# Patient Record
Sex: MALE | Race: WHITE | NOT HISPANIC OR LATINO | URBAN - METROPOLITAN AREA
[De-identification: names, ages, dates, MRNs, and addresses within clinical notes are randomized per-mention and may not be internally consistent; named-entity substitution may affect disease eponyms.]

---

## 2017-09-17 ENCOUNTER — EMERGENCY (EMERGENCY)
Facility: HOSPITAL | Age: 72
LOS: 1 days | Discharge: HOME | End: 2017-09-17
Attending: EMERGENCY MEDICINE
Payer: MEDICARE

## 2017-09-17 VITALS
OXYGEN SATURATION: 99 % | HEIGHT: 72 IN | WEIGHT: 240.08 LBS | SYSTOLIC BLOOD PRESSURE: 148 MMHG | RESPIRATION RATE: 18 BRPM | HEART RATE: 105 BPM | TEMPERATURE: 98 F | DIASTOLIC BLOOD PRESSURE: 89 MMHG

## 2017-09-17 VITALS
SYSTOLIC BLOOD PRESSURE: 153 MMHG | RESPIRATION RATE: 18 BRPM | OXYGEN SATURATION: 98 % | TEMPERATURE: 100 F | HEART RATE: 102 BPM | DIASTOLIC BLOOD PRESSURE: 85 MMHG

## 2017-09-17 LAB
ANION GAP SERPL CALC-SCNC: 16 MMOL/L — SIGNIFICANT CHANGE UP (ref 5–17)
APPEARANCE UR: CLEAR — SIGNIFICANT CHANGE UP
BASOPHILS # BLD AUTO: 0 K/UL — SIGNIFICANT CHANGE UP (ref 0–0.2)
BASOPHILS NFR BLD AUTO: 0.2 % — SIGNIFICANT CHANGE UP (ref 0–2)
BILIRUB UR-MCNC: NEGATIVE — SIGNIFICANT CHANGE UP
BUN SERPL-MCNC: 31 MG/DL — HIGH (ref 8–20)
CALCIUM SERPL-MCNC: 9.8 MG/DL — SIGNIFICANT CHANGE UP (ref 8.6–10.2)
CHLORIDE SERPL-SCNC: 97 MMOL/L — LOW (ref 98–107)
CO2 SERPL-SCNC: 22 MMOL/L — SIGNIFICANT CHANGE UP (ref 22–29)
COLOR SPEC: YELLOW — SIGNIFICANT CHANGE UP
CREAT SERPL-MCNC: 1.09 MG/DL — SIGNIFICANT CHANGE UP (ref 0.5–1.3)
DIFF PNL FLD: NEGATIVE — SIGNIFICANT CHANGE UP
EOSINOPHIL # BLD AUTO: 0 K/UL — SIGNIFICANT CHANGE UP (ref 0–0.5)
EOSINOPHIL NFR BLD AUTO: 0.1 % — SIGNIFICANT CHANGE UP (ref 0–5)
GLUCOSE SERPL-MCNC: 308 MG/DL — HIGH (ref 70–115)
GLUCOSE UR QL: 1000 MG/DL
HCT VFR BLD CALC: 50.9 % — SIGNIFICANT CHANGE UP (ref 42–52)
HGB BLD-MCNC: 18.4 G/DL — HIGH (ref 14–18)
KETONES UR-MCNC: ABNORMAL
LEUKOCYTE ESTERASE UR-ACNC: NEGATIVE — SIGNIFICANT CHANGE UP
LYMPHOCYTES # BLD AUTO: 1.3 K/UL — SIGNIFICANT CHANGE UP (ref 1–4.8)
LYMPHOCYTES # BLD AUTO: 11.2 % — LOW (ref 20–55)
MCHC RBC-ENTMCNC: 32.1 PG — HIGH (ref 27–31)
MCHC RBC-ENTMCNC: 36.1 G/DL — HIGH (ref 32–36)
MCV RBC AUTO: 88.8 FL — SIGNIFICANT CHANGE UP (ref 80–94)
MONOCYTES # BLD AUTO: 0.3 K/UL — SIGNIFICANT CHANGE UP (ref 0–0.8)
MONOCYTES NFR BLD AUTO: 2.3 % — LOW (ref 3–10)
NEUTROPHILS # BLD AUTO: 10.2 K/UL — HIGH (ref 1.8–8)
NEUTROPHILS NFR BLD AUTO: 85.7 % — HIGH (ref 37–73)
NITRITE UR-MCNC: NEGATIVE — SIGNIFICANT CHANGE UP
PH UR: 5 — SIGNIFICANT CHANGE UP (ref 5–8)
PLATELET # BLD AUTO: 262 K/UL — SIGNIFICANT CHANGE UP (ref 150–400)
POTASSIUM SERPL-MCNC: 5.1 MMOL/L — SIGNIFICANT CHANGE UP (ref 3.5–5.3)
POTASSIUM SERPL-SCNC: 5.1 MMOL/L — SIGNIFICANT CHANGE UP (ref 3.5–5.3)
PROT UR-MCNC: 30 MG/DL
RBC # BLD: 5.73 M/UL — SIGNIFICANT CHANGE UP (ref 4.6–6.2)
RBC # FLD: 12.6 % — SIGNIFICANT CHANGE UP (ref 11–15.6)
SODIUM SERPL-SCNC: 135 MMOL/L — SIGNIFICANT CHANGE UP (ref 135–145)
SP GR SPEC: 1.02 — SIGNIFICANT CHANGE UP (ref 1.01–1.02)
UROBILINOGEN FLD QL: 1 MG/DL
WBC # BLD: 11.9 K/UL — HIGH (ref 4.8–10.8)
WBC # FLD AUTO: 11.9 K/UL — HIGH (ref 4.8–10.8)
WBC UR QL: SIGNIFICANT CHANGE UP

## 2017-09-17 PROCEDURE — 99284 EMERGENCY DEPT VISIT MOD MDM: CPT | Mod: 25

## 2017-09-17 PROCEDURE — 96375 TX/PRO/DX INJ NEW DRUG ADDON: CPT

## 2017-09-17 PROCEDURE — 99284 EMERGENCY DEPT VISIT MOD MDM: CPT

## 2017-09-17 PROCEDURE — 73502 X-RAY EXAM HIP UNI 2-3 VIEWS: CPT | Mod: 26,RT

## 2017-09-17 PROCEDURE — 85027 COMPLETE CBC AUTOMATED: CPT

## 2017-09-17 PROCEDURE — 80048 BASIC METABOLIC PNL TOTAL CA: CPT

## 2017-09-17 PROCEDURE — 36415 COLL VENOUS BLD VENIPUNCTURE: CPT

## 2017-09-17 PROCEDURE — 81001 URINALYSIS AUTO W/SCOPE: CPT

## 2017-09-17 PROCEDURE — 73502 X-RAY EXAM HIP UNI 2-3 VIEWS: CPT

## 2017-09-17 PROCEDURE — 96374 THER/PROPH/DIAG INJ IV PUSH: CPT

## 2017-09-17 PROCEDURE — 93010 ELECTROCARDIOGRAM REPORT: CPT

## 2017-09-17 PROCEDURE — 72110 X-RAY EXAM L-2 SPINE 4/>VWS: CPT

## 2017-09-17 PROCEDURE — 87086 URINE CULTURE/COLONY COUNT: CPT

## 2017-09-17 PROCEDURE — 72110 X-RAY EXAM L-2 SPINE 4/>VWS: CPT | Mod: 26

## 2017-09-17 PROCEDURE — 93005 ELECTROCARDIOGRAM TRACING: CPT

## 2017-09-17 RX ORDER — DIAZEPAM 5 MG
1 TABLET ORAL
Qty: 15 | Refills: 0
Start: 2017-09-17 | End: 2017-09-22

## 2017-09-17 RX ORDER — OXYCODONE AND ACETAMINOPHEN 5; 325 MG/1; MG/1
1 TABLET ORAL ONCE
Qty: 0 | Refills: 0 | Status: DISCONTINUED | OUTPATIENT
Start: 2017-09-17 | End: 2017-09-17

## 2017-09-17 RX ORDER — DEXAMETHASONE 0.5 MG/5ML
6 ELIXIR ORAL ONCE
Qty: 0 | Refills: 0 | Status: DISCONTINUED | OUTPATIENT
Start: 2017-09-17 | End: 2017-09-17

## 2017-09-17 RX ORDER — KETOROLAC TROMETHAMINE 30 MG/ML
15 SYRINGE (ML) INJECTION ONCE
Qty: 0 | Refills: 0 | Status: DISCONTINUED | OUTPATIENT
Start: 2017-09-17 | End: 2017-09-17

## 2017-09-17 RX ORDER — SODIUM CHLORIDE 9 MG/ML
1000 INJECTION INTRAMUSCULAR; INTRAVENOUS; SUBCUTANEOUS
Qty: 0 | Refills: 0 | Status: DISCONTINUED | OUTPATIENT
Start: 2017-09-17 | End: 2017-09-21

## 2017-09-17 RX ORDER — DEXAMETHASONE 0.5 MG/5ML
6 ELIXIR ORAL ONCE
Qty: 0 | Refills: 0 | Status: COMPLETED | OUTPATIENT
Start: 2017-09-17 | End: 2017-09-17

## 2017-09-17 RX ORDER — OXYCODONE AND ACETAMINOPHEN 5; 325 MG/1; MG/1
1 TABLET ORAL EVERY 6 HOURS
Qty: 0 | Refills: 0 | Status: DISCONTINUED | OUTPATIENT
Start: 2017-09-17 | End: 2017-09-17

## 2017-09-17 RX ORDER — OXYCODONE AND ACETAMINOPHEN 5; 325 MG/1; MG/1
2 TABLET ORAL
Qty: 30 | Refills: 0
Start: 2017-09-17 | End: 2017-09-22

## 2017-09-17 RX ADMIN — Medication 6 MILLIGRAM(S): at 14:56

## 2017-09-17 RX ADMIN — OXYCODONE AND ACETAMINOPHEN 1 TABLET(S): 5; 325 TABLET ORAL at 17:47

## 2017-09-17 RX ADMIN — Medication 15 MILLIGRAM(S): at 17:47

## 2017-09-17 RX ADMIN — SODIUM CHLORIDE 150 MILLILITER(S): 9 INJECTION INTRAMUSCULAR; INTRAVENOUS; SUBCUTANEOUS at 19:51

## 2017-09-17 RX ADMIN — OXYCODONE AND ACETAMINOPHEN 1 TABLET(S): 5; 325 TABLET ORAL at 16:22

## 2017-09-17 RX ADMIN — OXYCODONE AND ACETAMINOPHEN 1 TABLET(S): 5; 325 TABLET ORAL at 22:27

## 2017-09-17 NOTE — ED PROVIDER NOTE - OBJECTIVE STATEMENT
72 year old male with a h/o sciatica presents with back pain. Pt had a similar episode one year ago and was seen by ortho who gave him steroid injections which relieved his symptoms

## 2017-09-17 NOTE — ED ADULT NURSE REASSESSMENT NOTE - NS ED NURSE REASSESS COMMENT FT1
Pt. verbalizes feeling better with percocet. Pt. able to sit comfortably in wheelchair. pt. ok for d/c.

## 2017-09-17 NOTE — ED PROVIDER NOTE - CHIEF COMPLAINT
The patient is a 72y Male complaining of low back pain.pt had a similar episode one year ago and was seen by ortho who felt that he had sciatica

## 2017-09-17 NOTE — ED PROVIDER NOTE - PROGRESS NOTE DETAILS
pt feels better and willl be discharged with outpatient follow up. pt already has an appointment for orthopedics

## 2017-09-17 NOTE — ED PROVIDER NOTE - CARE PLAN
Principal Discharge DX:	Acute low back pain with right-sided sciatica, unspecified back pain laterality  Secondary Diagnosis:	Diabetes  Secondary Diagnosis:	Hyperglycemia

## 2017-09-17 NOTE — ED ADULT NURSE REASSESSMENT NOTE - NS ED NURSE REASSESS COMMENT FT1
Assumed pt. care from Kaleb JOSHUA. Pt. is a/o x3 with family at bedside. Pt. is positioned on left side with pillows behind pelvis to support. Pt. verbalizes relief after pain medication. Pt. is able to move all extremities with ease. Pt. only has tenderness to touch in mid right buttocks when palpated. Pt. is smiling and interactive at this time and verbalizes desire to go home. Family refuses and wants pt. to stay in hospital d/t difficulty with ADL's. Informed MD Bustamante; requests to have patient attempt to ambulate and d/c to follow upwith pMD for MRI. Assumed pt. care from Kaleb JOSHUA. Pt. is a/o x3 with family at bedside. Pt. is positioned on left side with pillows behind pelvis to support. Pt. verbalizes relief after pain medication. Pt. is able to move all extremities with ease. Pt. only has tenderness to touch in mid right buttocks when palpated. No deformities or skin changes noted in area, skin is warm, dry, and intact. Pt. is smiling and interactive at this time and verbalizes desire to go home. Family refuses and wants pt. to stay in hospital d/t difficulty with ADL's. Informed MD Bustamante; requests to have patient attempt to ambulate and d/c to follow upwith pMD for MRI.

## 2017-09-19 LAB
CULTURE RESULTS: SIGNIFICANT CHANGE UP
SPECIMEN SOURCE: SIGNIFICANT CHANGE UP

## 2020-08-21 NOTE — ED PROVIDER NOTE - NEURO NEGATIVE STATEMENT, MLM
You can access the FollowMyHealth Patient Portal offered by [facility] by registering at the following website: [email or web address]. By joining [employer]’s FollowMyHealth portal, you will also be able to view your health information using other applications (apps) compatible with our system. no loss of consciousness, no gait abnormality, no headache, no sensory deficits, and no weakness.

## 2020-08-31 ENCOUNTER — NEW PATIENT (OUTPATIENT)
Dept: URBAN - METROPOLITAN AREA CLINIC 10 | Facility: CLINIC | Age: 75
End: 2020-08-31
Payer: COMMERCIAL

## 2020-08-31 DIAGNOSIS — I10 ESSENTIAL (PRIMARY) HYPERTENSION: ICD-10-CM

## 2020-08-31 DIAGNOSIS — E11.39 TYPE 2 DIABETES MELLITUS WITH OPHTHALMIC COMPLICAT: ICD-10-CM

## 2020-08-31 PROCEDURE — 92134 CPTRZ OPH DX IMG PST SGM RTA: CPT | Performed by: OPHTHALMOLOGY

## 2020-08-31 PROCEDURE — 92004 COMPRE OPH EXAM NEW PT 1/>: CPT | Performed by: OPHTHALMOLOGY

## 2020-08-31 ASSESSMENT — INTRAOCULAR PRESSURE
OS: 14
OD: 14

## 2020-08-31 ASSESSMENT — VISUAL ACUITY
OD: 20/50
OS: 20/40

## 2020-12-28 ENCOUNTER — FOLLOW UP ESTABLISHED (OUTPATIENT)
Dept: URBAN - METROPOLITAN AREA CLINIC 10 | Facility: CLINIC | Age: 75
End: 2020-12-28
Payer: COMMERCIAL

## 2020-12-28 DIAGNOSIS — Z79.84 LONG TERM (CURRENT) USE OF ORAL HYPOGLYCEMIC DRUGS: ICD-10-CM

## 2020-12-28 DIAGNOSIS — H43.823 VITREOMACULAR ADHESION, BILATERAL: ICD-10-CM

## 2020-12-28 PROCEDURE — 99214 OFFICE O/P EST MOD 30 MIN: CPT | Performed by: OPHTHALMOLOGY

## 2020-12-28 PROCEDURE — 92250 FUNDUS PHOTOGRAPHY W/I&R: CPT | Performed by: OPHTHALMOLOGY

## 2020-12-28 ASSESSMENT — VISUAL ACUITY
OS: 20/30
OD: 20/40

## 2020-12-28 ASSESSMENT — INTRAOCULAR PRESSURE
OD: 14
OS: 13

## 2021-04-02 ENCOUNTER — OFFICE VISIT (OUTPATIENT)
Dept: URBAN - METROPOLITAN AREA CLINIC 10 | Facility: CLINIC | Age: 76
End: 2021-04-02
Payer: COMMERCIAL

## 2021-04-02 DIAGNOSIS — H35.373 PUCKERING OF MACULA, BILATERAL: ICD-10-CM

## 2021-04-02 DIAGNOSIS — H35.379 PUCKERING OF MACULA, UNSPECIFIED EYE: ICD-10-CM

## 2021-04-02 PROCEDURE — 92134 CPTRZ OPH DX IMG PST SGM RTA: CPT | Performed by: OPHTHALMOLOGY

## 2021-04-02 PROCEDURE — 99214 OFFICE O/P EST MOD 30 MIN: CPT | Performed by: OPHTHALMOLOGY

## 2021-04-02 ASSESSMENT — VISUAL ACUITY
OS: 20/40
OD: 20/60

## 2021-04-02 ASSESSMENT — INTRAOCULAR PRESSURE
OS: 15
OD: 15

## 2021-04-02 ASSESSMENT — KERATOMETRY: OS: 43.25

## 2021-04-02 NOTE — IMPRESSION/PLAN
Impression: Age-related nuclear cataract, bilateral: H25.13. Plan: Suggest cataract surgery right eye first then left eye. 
Not a optimal candiATE FOR MULTIFOCAL LENSES, HAS DIBETIC RETINOPATHY

## 2021-04-02 NOTE — IMPRESSION/PLAN
Impression: Other vitreous opacities, left eye: H43.392. Plan: Reviewed with patient . .. Patient will contact the office immediately  if notes any new  vision changes. history of  VITRECTOMY IN RIGHT EYE. 
RETINAL SURGEON AT [facility] ~~ [address]

## 2021-04-02 NOTE — IMPRESSION/PLAN
Impression: Puckering of macula, unspecified eye: H35.379. Plan: Reviewed with patient . .. Patient will contact the office immediately  if notes any new  vision changes.

## 2021-04-02 NOTE — IMPRESSION/PLAN
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 Plan: Discussed good blood sugar and blood pressure - diet, exercise, and nutritional control emphasized to reduce future risk of diabetic complications. Maintain follow up with PCP.

## 2021-05-03 ENCOUNTER — ADULT PHYSICAL (OUTPATIENT)
Dept: URBAN - METROPOLITAN AREA CLINIC 10 | Facility: CLINIC | Age: 76
End: 2021-05-03
Payer: COMMERCIAL

## 2021-05-03 DIAGNOSIS — Z01.818 ENCOUNTER FOR OTHER PREPROCEDURAL EXAMINATION: Primary | ICD-10-CM

## 2021-05-03 DIAGNOSIS — H25.13 AGE-RELATED NUCLEAR CATARACT, BILATERAL: Primary | ICD-10-CM

## 2021-05-03 PROCEDURE — 92025 CPTRIZED CORNEAL TOPOGRAPHY: CPT | Performed by: OPHTHALMOLOGY

## 2021-05-03 PROCEDURE — 92136 OPHTHALMIC BIOMETRY: CPT | Performed by: OPHTHALMOLOGY

## 2021-05-03 PROCEDURE — 99202 OFFICE O/P NEW SF 15 MIN: CPT | Performed by: PHYSICIAN ASSISTANT

## 2021-05-04 ENCOUNTER — PRE-OPERATIVE VISIT (OUTPATIENT)
Dept: URBAN - METROPOLITAN AREA CLINIC 10 | Facility: CLINIC | Age: 76
End: 2021-05-04
Payer: COMMERCIAL

## 2021-05-04 DIAGNOSIS — H52.13 MYOPIA, BILATERAL: ICD-10-CM

## 2021-05-04 DIAGNOSIS — H52.221 REGULAR ASTIGMATISM, RIGHT EYE: ICD-10-CM

## 2021-05-04 DIAGNOSIS — H25.811 COMBINED FORMS OF AGE-RELATED CATARACT, RIGHT EYE: Primary | ICD-10-CM

## 2021-05-04 DIAGNOSIS — E11.3293 TYPE 2 DIAB W MILD NONPRLF DIABETIC RTNOP W/O MACULAR EDEMA, BILATERAL: ICD-10-CM

## 2021-05-04 DIAGNOSIS — H43.392 OTHER VITREOUS OPACITIES, LEFT EYE: ICD-10-CM

## 2021-05-04 DIAGNOSIS — H25.812 COMBINED FORMS OF AGE-RELATED CATARACT, LEFT EYE: ICD-10-CM

## 2021-05-04 DIAGNOSIS — I63.9 STROKE: ICD-10-CM

## 2021-05-04 PROCEDURE — 99214 OFFICE O/P EST MOD 30 MIN: CPT | Performed by: OPHTHALMOLOGY

## 2021-05-04 RX ORDER — DICLOFENAC SODIUM 1 MG/ML
0.1 % SOLUTION/ DROPS OPHTHALMIC
Qty: 5 | Refills: 2 | Status: ACTIVE
Start: 2021-05-04

## 2021-05-04 RX ORDER — PREDNISOLONE ACETATE 10 MG/ML
1 % SUSPENSION/ DROPS OPHTHALMIC
Qty: 5 | Refills: 2 | Status: ACTIVE
Start: 2021-05-04

## 2021-05-04 ASSESSMENT — INTRAOCULAR PRESSURE
OS: 15
OD: 16

## 2021-05-04 ASSESSMENT — PACHYMETRY
OS: 3.60
OD: 3.45
OS: 25.95
OD: 26.18

## 2021-05-04 NOTE — IMPRESSION/PLAN
Impression: Type 2 diab w mild nonprlf diabetic rtnop w/o macular edema, bilateral: H46.2285. Plan: Discussed diet, exercise, nutrition. Good blood sugar and blood pressure control. Maintain follow up with PCP. Discussed with patient, understands this may limit vision after surgery.

## 2021-05-04 NOTE — IMPRESSION/PLAN
Impression: Other vitreous opacities, left eye Plan: Discussed signs and symptoms of retinal detachment (flashes,floaters, curtain) as precaution . Patient instructed to call or return to clinic if condition gets worse. Discussed with patient, understands this may limit vision after surgery.

## 2021-05-04 NOTE — IMPRESSION/PLAN
Impression: Astigmatism, right eye: H52.221. Plan: Discussed with patient that LRI will attempt to reduce the astigmatism but will not get rid of all astigmatism and will need glasses after surgery. Discussed with patient, understands this may limit vision after surgery. Also discussed unmasking of astigmatism.

## 2021-05-04 NOTE — IMPRESSION/PLAN
Impression: Stroke: I63.9. Plan: Discussed with patient, understands this may limit vision after surgery.

## 2021-05-04 NOTE — IMPRESSION/PLAN
Impression: Puckering of macula, unspecified eye: H35.379. Plan: followed by Dr. Patrice Baez; s/p PPVIT/MP OD; Discussed signs and symptoms of retinal detachment (flashes,floaters, curtain) as precaution . Patient instructed to call or return to clinic if condition gets worse. Discussed with patient, understands this may limit vision after surgery.

## 2021-05-05 ENCOUNTER — TESTING ONLY (OUTPATIENT)
Dept: URBAN - METROPOLITAN AREA CLINIC 43 | Facility: CLINIC | Age: 76
End: 2021-05-05
Payer: COMMERCIAL

## 2021-05-05 PROCEDURE — 92082 INTERMEDIATE VISUAL FIELD XM: CPT | Performed by: OPTOMETRIST

## 2021-05-11 ENCOUNTER — SURGERY (OUTPATIENT)
Dept: URBAN - METROPOLITAN AREA SURGERY 5 | Facility: SURGERY | Age: 76
End: 2021-05-11
Payer: COMMERCIAL

## 2021-05-11 PROCEDURE — 66982 XCAPSL CTRC RMVL CPLX WO ECP: CPT | Performed by: OPHTHALMOLOGY

## 2021-05-12 ENCOUNTER — POST-OPERATIVE VISIT (OUTPATIENT)
Dept: URBAN - METROPOLITAN AREA CLINIC 10 | Facility: CLINIC | Age: 76
End: 2021-05-12
Payer: COMMERCIAL

## 2021-05-12 PROCEDURE — 99024 POSTOP FOLLOW-UP VISIT: CPT | Performed by: OPTOMETRIST

## 2021-05-12 ASSESSMENT — INTRAOCULAR PRESSURE: OD: 14

## 2021-05-12 NOTE — IMPRESSION/PLAN
Impression: S/P CE/Standard IOL OD - 1 Day. Encounter for surgical aftercare following surgery on a sense organ  Z48.810. Post operative instructions reviewed - Plan: Begin ofloxacin tid x 1 wk, diclofenac tid x 2 weeks and pred acetate tid x 4 weeks. 
RTC 5/19/2021

## 2021-05-19 ENCOUNTER — POST-OPERATIVE VISIT (OUTPATIENT)
Dept: URBAN - METROPOLITAN AREA CLINIC 10 | Facility: CLINIC | Age: 76
End: 2021-05-19
Payer: COMMERCIAL

## 2021-05-19 DIAGNOSIS — Z48.810 ENCOUNTER FOR SURGICAL AFTERCARE FOLLOWING SURGERY ON A SENSE ORGAN: Primary | ICD-10-CM

## 2021-05-19 PROCEDURE — 99024 POSTOP FOLLOW-UP VISIT: CPT | Performed by: OPTOMETRIST

## 2021-05-19 ASSESSMENT — INTRAOCULAR PRESSURE
OS: 15
OD: 15

## 2021-05-19 ASSESSMENT — VISUAL ACUITY
OD: 20/20
OS: 20/400

## 2021-05-19 NOTE — IMPRESSION/PLAN
Impression: S/P Cataract Extraction/IOL/ORA; LRI OD - 8 Days. Encounter for surgical aftercare following surgery on a sense organ  Z48.810. Condition is improving - Plan: Pt. happy c vision. Confirmed Distance Aim  Cleared for surgery second eye OS

## 2021-05-25 ENCOUNTER — SURGERY (OUTPATIENT)
Dept: URBAN - METROPOLITAN AREA SURGERY 5 | Facility: SURGERY | Age: 76
End: 2021-05-25
Payer: COMMERCIAL

## 2021-05-25 PROCEDURE — 66984 XCAPSL CTRC RMVL W/O ECP: CPT | Performed by: OPHTHALMOLOGY

## 2021-05-26 ENCOUNTER — POST-OPERATIVE VISIT (OUTPATIENT)
Dept: URBAN - METROPOLITAN AREA CLINIC 10 | Facility: CLINIC | Age: 76
End: 2021-05-26
Payer: COMMERCIAL

## 2021-05-26 DIAGNOSIS — Z96.1 PRESENCE OF INTRAOCULAR LENS: Primary | ICD-10-CM

## 2021-05-26 PROCEDURE — 99024 POSTOP FOLLOW-UP VISIT: CPT | Performed by: OPTOMETRIST

## 2021-05-26 ASSESSMENT — INTRAOCULAR PRESSURE: OS: 20

## 2021-05-26 NOTE — IMPRESSION/PLAN
Impression: S/P Cataract Extraction/IOL/ORA OS - 1 Day. Presence of intraocular lens  Z96.1. Plan: Dexycu c gtts. Begin pred acetate and diclofenac tid x 2 weeks  RTC as scheduled c MRx and IOP check

## 2021-06-16 ENCOUNTER — POST-OPERATIVE VISIT (OUTPATIENT)
Dept: URBAN - METROPOLITAN AREA CLINIC 10 | Facility: CLINIC | Age: 76
End: 2021-06-16
Payer: COMMERCIAL

## 2021-06-16 PROCEDURE — 99024 POSTOP FOLLOW-UP VISIT: CPT | Performed by: OPTOMETRIST

## 2021-06-16 ASSESSMENT — VISUAL ACUITY
OS: 20/25
OD: 20/25

## 2021-06-16 ASSESSMENT — INTRAOCULAR PRESSURE
OD: 15
OS: 14

## 2021-06-16 NOTE — IMPRESSION/PLAN
Impression:  Presence of intraocular lens  Z96.1. Plan: Pt. happy c vision. Cont. OTC readers. RTC 6 months for complete.

## 2023-08-02 PROBLEM — E11.9 TYPE 2 DIABETES MELLITUS WITHOUT COMPLICATIONS: Chronic | Status: ACTIVE | Noted: 2017-09-17

## 2023-08-02 PROBLEM — I10 ESSENTIAL (PRIMARY) HYPERTENSION: Chronic | Status: ACTIVE | Noted: 2017-09-17

## 2023-08-07 ENCOUNTER — INPATIENT (INPATIENT)
Facility: HOSPITAL | Age: 78
LOS: 0 days | Discharge: HOME | DRG: 247 | End: 2023-08-08
Attending: INTERNAL MEDICINE | Admitting: INTERNAL MEDICINE
Payer: COMMERCIAL

## 2023-08-07 VITALS
HEIGHT: 73 IN | HEART RATE: 64 BPM | RESPIRATION RATE: 28 BRPM | OXYGEN SATURATION: 100 % | WEIGHT: 248.02 LBS | DIASTOLIC BLOOD PRESSURE: 78 MMHG | SYSTOLIC BLOOD PRESSURE: 145 MMHG

## 2023-08-07 DIAGNOSIS — R94.39 ABNORMAL RESULT OF OTHER CARDIOVASCULAR FUNCTION STUDY: ICD-10-CM

## 2023-08-07 DIAGNOSIS — Z98.890 OTHER SPECIFIED POSTPROCEDURAL STATES: Chronic | ICD-10-CM

## 2023-08-07 LAB
ANION GAP SERPL CALC-SCNC: 10 MMOL/L — SIGNIFICANT CHANGE UP (ref 5–17)
BASOPHILS # BLD AUTO: 0.06 K/UL — SIGNIFICANT CHANGE UP (ref 0–0.2)
BASOPHILS NFR BLD AUTO: 0.9 % — SIGNIFICANT CHANGE UP (ref 0–2)
BLD GP AB SCN SERPL QL: SIGNIFICANT CHANGE UP
BUN SERPL-MCNC: 23.3 MG/DL — HIGH (ref 8–20)
CALCIUM SERPL-MCNC: 9.1 MG/DL — SIGNIFICANT CHANGE UP (ref 8.4–10.5)
CHLORIDE SERPL-SCNC: 102 MMOL/L — SIGNIFICANT CHANGE UP (ref 96–108)
CO2 SERPL-SCNC: 26 MMOL/L — SIGNIFICANT CHANGE UP (ref 22–29)
CREAT SERPL-MCNC: 0.95 MG/DL — SIGNIFICANT CHANGE UP (ref 0.5–1.3)
EGFR: 83 ML/MIN/1.73M2 — SIGNIFICANT CHANGE UP
EOSINOPHIL # BLD AUTO: 0.29 K/UL — SIGNIFICANT CHANGE UP (ref 0–0.5)
EOSINOPHIL NFR BLD AUTO: 4.4 % — SIGNIFICANT CHANGE UP (ref 0–6)
GLUCOSE BLDC GLUCOMTR-MCNC: 74 MG/DL — SIGNIFICANT CHANGE UP (ref 70–99)
GLUCOSE SERPL-MCNC: 114 MG/DL — HIGH (ref 70–99)
HCT VFR BLD CALC: 39.6 % — SIGNIFICANT CHANGE UP (ref 39–50)
HGB BLD-MCNC: 13 G/DL — SIGNIFICANT CHANGE UP (ref 13–17)
IMM GRANULOCYTES NFR BLD AUTO: 0.3 % — SIGNIFICANT CHANGE UP (ref 0–0.9)
LYMPHOCYTES # BLD AUTO: 2.37 K/UL — SIGNIFICANT CHANGE UP (ref 1–3.3)
LYMPHOCYTES # BLD AUTO: 35.9 % — SIGNIFICANT CHANGE UP (ref 13–44)
MCHC RBC-ENTMCNC: 31.3 PG — SIGNIFICANT CHANGE UP (ref 27–34)
MCHC RBC-ENTMCNC: 32.8 GM/DL — SIGNIFICANT CHANGE UP (ref 32–36)
MCV RBC AUTO: 95.4 FL — SIGNIFICANT CHANGE UP (ref 80–100)
MONOCYTES # BLD AUTO: 0.65 K/UL — SIGNIFICANT CHANGE UP (ref 0–0.9)
MONOCYTES NFR BLD AUTO: 9.8 % — SIGNIFICANT CHANGE UP (ref 2–14)
NEUTROPHILS # BLD AUTO: 3.22 K/UL — SIGNIFICANT CHANGE UP (ref 1.8–7.4)
NEUTROPHILS NFR BLD AUTO: 48.7 % — SIGNIFICANT CHANGE UP (ref 43–77)
PLATELET # BLD AUTO: 208 K/UL — SIGNIFICANT CHANGE UP (ref 150–400)
POTASSIUM SERPL-MCNC: 4.3 MMOL/L — SIGNIFICANT CHANGE UP (ref 3.5–5.3)
POTASSIUM SERPL-SCNC: 4.3 MMOL/L — SIGNIFICANT CHANGE UP (ref 3.5–5.3)
RBC # BLD: 4.15 M/UL — LOW (ref 4.2–5.8)
RBC # FLD: 13.3 % — SIGNIFICANT CHANGE UP (ref 10.3–14.5)
SODIUM SERPL-SCNC: 138 MMOL/L — SIGNIFICANT CHANGE UP (ref 135–145)
WBC # BLD: 6.61 K/UL — SIGNIFICANT CHANGE UP (ref 3.8–10.5)
WBC # FLD AUTO: 6.61 K/UL — SIGNIFICANT CHANGE UP (ref 3.8–10.5)

## 2023-08-07 PROCEDURE — 93010 ELECTROCARDIOGRAM REPORT: CPT | Mod: 76

## 2023-08-07 RX ORDER — CLOPIDOGREL BISULFATE 75 MG/1
75 TABLET, FILM COATED ORAL DAILY
Refills: 0 | Status: DISCONTINUED | OUTPATIENT
Start: 2023-08-07 | End: 2023-08-08

## 2023-08-07 RX ORDER — ASPIRIN/CALCIUM CARB/MAGNESIUM 324 MG
81 TABLET ORAL DAILY
Refills: 0 | Status: DISCONTINUED | OUTPATIENT
Start: 2023-08-07 | End: 2023-08-08

## 2023-08-07 RX ORDER — ATORVASTATIN CALCIUM 80 MG/1
80 TABLET, FILM COATED ORAL AT BEDTIME
Refills: 0 | Status: DISCONTINUED | OUTPATIENT
Start: 2023-08-07 | End: 2023-08-08

## 2023-08-07 RX ORDER — LISINOPRIL 2.5 MG/1
10 TABLET ORAL DAILY
Refills: 0 | Status: DISCONTINUED | OUTPATIENT
Start: 2023-08-07 | End: 2023-08-08

## 2023-08-07 RX ORDER — METOPROLOL TARTRATE 50 MG
200 TABLET ORAL DAILY
Refills: 0 | Status: DISCONTINUED | OUTPATIENT
Start: 2023-08-07 | End: 2023-08-08

## 2023-08-07 RX ORDER — SODIUM CHLORIDE 9 MG/ML
250 INJECTION INTRAMUSCULAR; INTRAVENOUS; SUBCUTANEOUS ONCE
Refills: 0 | Status: COMPLETED | OUTPATIENT
Start: 2023-08-07 | End: 2023-08-07

## 2023-08-07 RX ORDER — FOLIC ACID 0.8 MG
1 TABLET ORAL DAILY
Refills: 0 | Status: DISCONTINUED | OUTPATIENT
Start: 2023-08-07 | End: 2023-08-08

## 2023-08-07 RX ORDER — CHLORHEXIDINE GLUCONATE 213 G/1000ML
1 SOLUTION TOPICAL ONCE
Refills: 0 | Status: DISCONTINUED | OUTPATIENT
Start: 2023-08-07 | End: 2023-08-08

## 2023-08-07 RX ADMIN — SODIUM CHLORIDE 500 MILLILITER(S): 9 INJECTION INTRAMUSCULAR; INTRAVENOUS; SUBCUTANEOUS at 16:39

## 2023-08-07 NOTE — H&P PST ADULT - ASSESSMENT
This is a 77 yo male with a PMHx of CVA, HTN, HLD and DM with c/o sharp exertional CP and WHEELER.  He has a known  of the LAD from previous catheterization.  His RCA on that cath was no visualized.  A whole heart CA score from 7/5/23 was noted to be 4693 with an Agatston's aortic score of 2115, and was R heart dominant. History of exposure to agent orange while in the .    ASA 2  Mallampati 2  GFR 83  Creat 0.95  Bleeding Risk 0.9     Plan: PRE-PROCEDURE ASSESSMENT    -Patient seen and examined  PRE-PROCEDURE ASSESSMENT  -NPO after midnight confirmed  -IV insert  -Patient seen and examined  -Labs reviewed  -Pre-procedure teaching completed with patient   -consent obtained   -Questions answered

## 2023-08-07 NOTE — ASU PATIENT PROFILE, ADULT - FALL HARM RISK - RISK INTERVENTIONS

## 2023-08-07 NOTE — H&P PST ADULT - NSICDXPASTMEDICALHX_GEN_ALL_CORE_FT
PAST MEDICAL HISTORY:  CVA (cerebrovascular accident)     DDD (degenerative disc disease), lumbar     Diabetes     Elevated coronary artery calcium score     History of first degree AV block     HTN (hypertension)

## 2023-08-07 NOTE — H&P PST ADULT - HISTORY OF PRESENT ILLNESS
Narrative: This is a 79 yo male with a PMHx of CVA, HTN, HLD and DM with c/o sharp exertional CP and WHEELER.  He has a known  of the LAD from previous catheterization.  His RCA on that cath was no visualized.  A whole heart CA score from 7/5/23 was noted to be 4693 with an Agatston's aortic score of 2115, and was R heart dominant.     Symptoms:        Angina (Class): 3       Ischemic Symptoms:     Heart Failure:        Systolic/Diastolic/Combined:        NYHA Class (within 2 weeks):     Assessment of LVEF:       EF:        Assessed by:        Date:     Prior Cardiac Interventions:       PCI's:        CABG:     Noninvasive Testing:   Stress Test: Date:        Protocol:        Duration of Exercise:        Symptoms:        EKG Changes:        DTS:        Myocardial Imaging:        Risk Assessment:     Echo:     Antianginal Therapies: None       Beta Blockers:         Calcium Channel Blockers:        Long Acting Nitrates:        Ranexa:     Associated Risk Factors:        Cerebrovascular Disease: N/A       Chronic Lung Disease: N/A       Peripheral Arterial Disease: YES sp CVA       Chronic Kidney Disease (if yes, what is GFR): N/A       Uncontrolled Diabetes (if yes, what is HgbA1C or FBS): N/A       Poorly Controlled Hypertension (if yes, what is SBP): N/A       Morbid Obesity (if yes, what is BMI): N/A       History of Recent Ventricular Arrhythmia: N/A       Inability to Ambulate Safely: N/A       Need for Therapeutic Anticoagulation: N/A       Antiplatelet or Contrast Allergy: N/A Narrative: This is a 77 yo male with a PMHx of CVA, HTN, HLD and DM with c/o sharp exertional CP and WHEELER.  He has a known  of the LAD from previous catheterization.  His RCA on that cath was no visualized.  A whole heart CA score from 7/5/23 was noted to be 4693 with an Agatston's aortic score of 2115, and was R heart dominant. History of exposure to agent orange while in the .    Symptoms:        Angina (Class): 3       Ischemic Symptoms:     Heart Failure: No         Assessment of LVEF:       EF:        Assessed by:        Date:     Prior Cardiac Interventions:       PCI's: Cardiac Catheterization on 7/12/23 @ [facility]       CABG:     Noninvasive Testing:   Echo: Done at [facility]       Antianginal Therapies: None       Beta Blockers:         Calcium Channel Blockers:        Long Acting Nitrates:        Ranexa:     Associated Risk Factors:        Cerebrovascular Disease: N/A       Chronic Lung Disease: N/A       Peripheral Arterial Disease: YES sp CVA       Chronic Kidney Disease (if yes, what is GFR): N/A       Uncontrolled Diabetes (if yes, what is HgbA1C or FBS): N/A       Poorly Controlled Hypertension (if yes, what is SBP): N/A       Morbid Obesity (if yes, what is BMI): N/A       History of Recent Ventricular Arrhythmia: N/A       Inability to Ambulate Safely: N/A       Need for Therapeutic Anticoagulation: N/A       Antiplatelet or Contrast Allergy: N/A

## 2023-08-07 NOTE — PROGRESS NOTE ADULT - SUBJECTIVE AND OBJECTIVE BOX
Department of Cardiology  [facility]/[place] [address] [address]  Telephone: [phone]. Fax:[phone]    Cardiology PA F/U Note Post PCI  SP: LHSUMAN. Stent x 1 RCA. Final report to follow     Denies complaints of chest pain, SOB, dizziness, palpitations or any o    Medications changes: No meds changes. Discussed with Dr Mora    HPI: This is a 77 yo male with a PMHx of CVA, HTN, HLD and DM with c/o sharp exertional CP and WHEELER.  He has a known  of the LAD from previous catheterization.  His RCA on that cath was no visualized.  A whole heart CA score from 7/5/23 was noted to be 4693 with an Agatston's aortic score of 2115, and was R heart dominant. History of exposure to agent orange while in the .    PHYSICAL EXAM:   Constitutional: Comfortable . No acute distress.   HEENT: Atraumatic and normocephalic , neck is supple . no JVD.   CNS: A&Ox3. No focal deficits.   Respiratory: CTAB, unlabored. No wheezing, No rhonchi. No crackles   Cardiovascular: RRR normal s1 s2. No murmur. No rubs or gallop.  Gastrointestinal: Soft, non-tender. +Bowel sounds.   R groin: + Benign. No hematoma,  No bleeding.   Extremities: 2+ Peripheral Pulses, No edema  Psychiatric: Calm . no agitation.   Skin: Warm and dry    Vital Signs Last 24 Hrs  T(C): 36.6 (07 Aug 2023 13:57), Max: 36.6 (07 Aug 2023 13:57)  T(F): 97.8 (07 Aug 2023 13:57), Max: 97.8 (07 Aug 2023 13:57)  HR: 66 (07 Aug 2023 13:57) (64 - 66)  BP: 145/78 (07 Aug 2023 13:57) (145/78 - 145/78)  BP(mean): 100 (07 Aug 2023 12:35) (100 - 100)  RR: 17 (07 Aug 2023 13:57) (17 - 28)  SpO2: 100% (07 Aug 2023 13:57) (100% - 100%)    Parameters below as of 07 Aug 2023 13:57  Patient On (Oxygen Delivery Method): room air    Assessment and Plan:    HPI: This is a 77 yo male with a PMHx of CVA, HTN, HLD and DM with c/o sharp exertional CP and WHEELER.  He has a known  of the LAD from previous catheterization.  His RCA on that cath was no visualized.  A whole heart CA score from 7/5/23 was noted to be 4693 with an Agatston's aortic score of 2115, and was R heart dominant. History of exposure to agent orange while in the .    Plan:   - Bedrest for  6  hours  - Continue current meds ASA, Plavix. statin, lisinopril and BB.   - AM labs, and EKG ordered  - Medication compliance, groin post procedural care and instructions reviewed with patient.  - Diet instruction given for low cholesterol and low sodium  - Cardiac rehab info provided/referral and communication to cardiac rehab completed  - All questions answered and education completed                Department of Cardiology  [facility]/[place] [address] [address]  Telephone: [phone]. Fax:[phone]    Cardiology PA F/U Note Post PCI  SP: LHSUMAN. Stent x 1 RCA. Final report to follow     Denies complaints of chest pain, SOB, dizziness, palpitations or any o    Medications changes: No meds changes. Discussed with Dr Moar    HPI: This is a 79 yo male with a PMHx of CVA, HTN, HLD and DM with c/o sharp exertional CP and WHEELER.  He has a known  of the LAD from previous catheterization.  His RCA on that cath was no visualized.  A whole heart CA score from 7/5/23 was noted to be 4693 with an Agatston's aortic score of 2115, and was R heart dominant. History of exposure to agent orange while in the .    PHYSICAL EXAM:   Constitutional: Comfortable . No acute distress.   HEENT: Atraumatic and normocephalic , neck is supple . no JVD.   CNS: A&Ox3. No focal deficits.   Respiratory: CTAB, unlabored. No wheezing, No rhonchi. No crackles   Cardiovascular: RRR normal s1 s2. No murmur. No rubs or gallop.  Gastrointestinal: Soft, non-tender. +Bowel sounds.   L groin: + Benign. No hematoma,  No bleeding.   Extremities: 2+ Peripheral Pulses, No edema  Psychiatric: Calm . no agitation.   Skin: Warm and dry    Vital Signs Last 24 Hrs  T(C): 36.6 (07 Aug 2023 13:57), Max: 36.6 (07 Aug 2023 13:57)  T(F): 97.8 (07 Aug 2023 13:57), Max: 97.8 (07 Aug 2023 13:57)  HR: 66 (07 Aug 2023 13:57) (64 - 66)  BP: 145/78 (07 Aug 2023 13:57) (145/78 - 145/78)  BP(mean): 100 (07 Aug 2023 12:35) (100 - 100)  RR: 17 (07 Aug 2023 13:57) (17 - 28)  SpO2: 100% (07 Aug 2023 13:57) (100% - 100%)    Parameters below as of 07 Aug 2023 13:57  Patient On (Oxygen Delivery Method): room air    Assessment and Plan:    HPI: This is a 79 yo male with a PMHx of CVA, HTN, HLD and DM with c/o sharp exertional CP and WHEELER.  He has a known  of the LAD from previous catheterization.  His RCA on that cath was no visualized.  A whole heart CA score from 7/5/23 was noted to be 4693 with an Agatston's aortic score of 2115, and was R heart dominant. History of exposure to agent orange while in the .    Plan:   - Bedrest for  6  hours  - Continue current meds ASA, Plavix. statin, lisinopril and BB.   - AM labs, and EKG ordered  - Medication compliance, groin post procedural care and instructions reviewed with patient.  - Diet instruction given for low cholesterol and low sodium  - Cardiac rehab info provided/referral and communication to cardiac rehab completed  - All questions answered and education completed                Department of Cardiology  [facility]/[place] [address] [address] [facility]  Telephone: [phone]. Fax:[phone]    Cardiology PA F/U Note Post PCI  SP: BRAYDEN. Stent x 1 RCA. Final report to follow     Denies complaints of chest pain, SOB, dizziness, palpitations or any o    Medications changes: No meds changes. Discussed with Dr Mora    HPI: This is a 79 yo male with a PMHx of CVA, HTN, HLD and DM with c/o sharp exertional CP and WHEELER.  He has a known  of the LAD from previous catheterization.  His RCA on that cath was no visualized.  A whole heart CA score from 7/5/23 was noted to be 4693 with an Agatston's aortic score of 2115, and was R heart dominant. History of exposure to agent orange while in the .    PHYSICAL EXAM:   Constitutional: Comfortable . No acute distress.   HEENT: Atraumatic and normocephalic , neck is supple . no JVD.   CNS: A&Ox3. No focal deficits.   Respiratory: CTAB, unlabored. No wheezing, No rhonchi. No crackles   Cardiovascular: RRR normal s1 s2. + murmur 2 ICS L and R sternum 3/6. No rubs or gallop.  Gastrointestinal: Soft, non-tender. +Bowel sounds.   L groin: + Benign. No hematoma,  No bleeding.   Extremities: 2+ Peripheral Pulses, No edema  Psychiatric: Calm . no agitation.   Skin: Warm and dry    Vital Signs Last 24 Hrs  T(C): 36.6 (07 Aug 2023 13:57), Max: 36.6 (07 Aug 2023 13:57)  T(F): 97.8 (07 Aug 2023 13:57), Max: 97.8 (07 Aug 2023 13:57)  HR: 66 (07 Aug 2023 13:57) (64 - 66)  BP: 145/78 (07 Aug 2023 13:57) (145/78 - 145/78)  BP(mean): 100 (07 Aug 2023 12:35) (100 - 100)  RR: 17 (07 Aug 2023 13:57) (17 - 28)  SpO2: 100% (07 Aug 2023 13:57) (100% - 100%)    Parameters below as of 07 Aug 2023 13:57  Patient On (Oxygen Delivery Method): room air    Assessment and Plan:    HPI: This is a 79 yo male with a PMHx of CVA, HTN, HLD and DM with c/o sharp exertional CP and WHEELER.  He has a known  of the LAD from previous catheterization.  His RCA on that cath was no visualized.  A whole heart CA score from 7/5/23 was noted to be 4693 with an Agatston's aortic score of 2115, and was R heart dominant. History of exposure to agent orange while in the .    Plan:   - Bedrest for  6  hours  - Continue current meds ASA, Plavix. statin, lisinopril and BB.   - AM labs, and EKG ordered  - Medication compliance, groin post procedural care and instructions reviewed with patient.  - Diet instruction given for low cholesterol and low sodium  - Cardiac rehab info provided/referral and communication to cardiac rehab completed  - All questions answered and education completed                Department of Cardiology  [facility]/[place] [address] [address]  Telephone: [phone]. Fax:[phone]    Cardiology PA F/U Note Post PCI  SP: BRAYDEN. Stent x 1 RCA. Final report to follow     Denies complaints of chest pain, SOB, dizziness, palpitations or any o    Medications changes: No meds changes. Discussed with Dr Mora    HPI: This is a 77 yo male with a PMHx of CVA, HTN, HLD and DM with c/o sharp exertional CP and WHEELER.  He has a known  of the LAD from previous catheterization.  His RCA on that cath was no visualized.  A whole heart CA score from 7/5/23 was noted to be 4693 with an Agatston's aortic score of 2115, and was R heart dominant. History of exposure to agent orange while in the .    PHYSICAL EXAM:   Constitutional: Comfortable . No acute distress.   HEENT: Atraumatic and normocephalic , neck is supple . no JVD.   CNS: A&Ox3. No focal deficits.   Respiratory: CTAB, unlabored. No wheezing, No rhonchi. No crackles   Cardiovascular: RRR normal s1 s2. + murmur 2 ICS L and R sternum 3/6. No rubs or gallop.  Gastrointestinal: Soft, non-tender. +Bowel sounds.   L groin: + Benign. No hematoma,  No bleeding.   Extremities: 2+ Peripheral Pulses, No edema  Psychiatric: Calm . no agitation.   Skin: Warm and dry    Vital Signs Last 24 Hrs  T(C): 36.6 (07 Aug 2023 13:57), Max: 36.6 (07 Aug 2023 13:57)  T(F): 97.8 (07 Aug 2023 13:57), Max: 97.8 (07 Aug 2023 13:57)  HR: 66 (07 Aug 2023 13:57) (64 - 66)  BP: 145/78 (07 Aug 2023 13:57) (145/78 - 145/78)  BP(mean): 100 (07 Aug 2023 12:35) (100 - 100)  RR: 17 (07 Aug 2023 13:57) (17 - 28)  SpO2: 100% (07 Aug 2023 13:57) (100% - 100%)    Parameters below as of 07 Aug 2023 13:57  Patient On (Oxygen Delivery Method): room air    Assessment and Plan:    HPI: This is a 77 yo male with a PMHx of CVA, HTN, HLD and DM with c/o sharp exertional CP and WHEELER.  He has a known  of the LAD from previous catheterization.  His RCA on that cath was no visualized.  A whole heart CA score from 7/5/23 was noted to be 4693 with an Agatston's aortic score of 2115, and was R heart dominant. History of exposure to agent orange while in the .    Plan:   - Bedrest for  6  hours  - Post EKG; Sinus rhythm 1er degree LV block and LBBB   - Continue current meds ASA, Plavix. statin, lisinopril and BB.   - AM labs, and EKG ordered  - Medication compliance, groin post procedural care and instructions reviewed with patient.  - Diet instruction given for low cholesterol and low sodium  - Cardiac rehab info provided/referral and communication to cardiac rehab completed  - All questions answered and education completed

## 2023-08-08 VITALS — RESPIRATION RATE: 17 BRPM | SYSTOLIC BLOOD PRESSURE: 109 MMHG | HEART RATE: 72 BPM | DIASTOLIC BLOOD PRESSURE: 55 MMHG

## 2023-08-08 LAB
ALBUMIN SERPL ELPH-MCNC: 3.7 G/DL — SIGNIFICANT CHANGE UP (ref 3.3–5.2)
ALP SERPL-CCNC: 85 U/L — SIGNIFICANT CHANGE UP (ref 40–120)
ALT FLD-CCNC: 18 U/L — SIGNIFICANT CHANGE UP
ANION GAP SERPL CALC-SCNC: 12 MMOL/L — SIGNIFICANT CHANGE UP (ref 5–17)
AST SERPL-CCNC: 18 U/L — SIGNIFICANT CHANGE UP
BILIRUB SERPL-MCNC: 0.7 MG/DL — SIGNIFICANT CHANGE UP (ref 0.4–2)
BUN SERPL-MCNC: 17.5 MG/DL — SIGNIFICANT CHANGE UP (ref 8–20)
CALCIUM SERPL-MCNC: 9 MG/DL — SIGNIFICANT CHANGE UP (ref 8.4–10.5)
CHLORIDE SERPL-SCNC: 102 MMOL/L — SIGNIFICANT CHANGE UP (ref 96–108)
CO2 SERPL-SCNC: 23 MMOL/L — SIGNIFICANT CHANGE UP (ref 22–29)
CREAT SERPL-MCNC: 0.84 MG/DL — SIGNIFICANT CHANGE UP (ref 0.5–1.3)
EGFR: 90 ML/MIN/1.73M2 — SIGNIFICANT CHANGE UP
GLUCOSE BLDC GLUCOMTR-MCNC: 157 MG/DL — HIGH (ref 70–99)
GLUCOSE SERPL-MCNC: 113 MG/DL — HIGH (ref 70–99)
HCT VFR BLD CALC: 38.2 % — LOW (ref 39–50)
HGB BLD-MCNC: 12.9 G/DL — LOW (ref 13–17)
MAGNESIUM SERPL-MCNC: 1.5 MG/DL — LOW (ref 1.6–2.6)
MCHC RBC-ENTMCNC: 31.5 PG — SIGNIFICANT CHANGE UP (ref 27–34)
MCHC RBC-ENTMCNC: 33.8 GM/DL — SIGNIFICANT CHANGE UP (ref 32–36)
MCV RBC AUTO: 93.4 FL — SIGNIFICANT CHANGE UP (ref 80–100)
PLATELET # BLD AUTO: 183 K/UL — SIGNIFICANT CHANGE UP (ref 150–400)
POTASSIUM SERPL-MCNC: 4 MMOL/L — SIGNIFICANT CHANGE UP (ref 3.5–5.3)
POTASSIUM SERPL-SCNC: 4 MMOL/L — SIGNIFICANT CHANGE UP (ref 3.5–5.3)
PROT SERPL-MCNC: 5.9 G/DL — LOW (ref 6.6–8.7)
RBC # BLD: 4.09 M/UL — LOW (ref 4.2–5.8)
RBC # FLD: 13.3 % — SIGNIFICANT CHANGE UP (ref 10.3–14.5)
SODIUM SERPL-SCNC: 137 MMOL/L — SIGNIFICANT CHANGE UP (ref 135–145)
WBC # BLD: 8.17 K/UL — SIGNIFICANT CHANGE UP (ref 3.8–10.5)
WBC # FLD AUTO: 8.17 K/UL — SIGNIFICANT CHANGE UP (ref 3.8–10.5)

## 2023-08-08 PROCEDURE — 92978 ENDOLUMINL IVUS OCT C 1ST: CPT | Mod: RC

## 2023-08-08 PROCEDURE — 86901 BLOOD TYPING SEROLOGIC RH(D): CPT

## 2023-08-08 PROCEDURE — 86850 RBC ANTIBODY SCREEN: CPT

## 2023-08-08 PROCEDURE — C1889: CPT

## 2023-08-08 PROCEDURE — 85025 COMPLETE CBC W/AUTO DIFF WBC: CPT

## 2023-08-08 PROCEDURE — C9602: CPT | Mod: RC

## 2023-08-08 PROCEDURE — C1724: CPT

## 2023-08-08 PROCEDURE — 82962 GLUCOSE BLOOD TEST: CPT

## 2023-08-08 PROCEDURE — C1761: CPT

## 2023-08-08 PROCEDURE — 85027 COMPLETE CBC AUTOMATED: CPT

## 2023-08-08 PROCEDURE — 83735 ASSAY OF MAGNESIUM: CPT

## 2023-08-08 PROCEDURE — 93010 ELECTROCARDIOGRAM REPORT: CPT

## 2023-08-08 PROCEDURE — 86900 BLOOD TYPING SEROLOGIC ABO: CPT

## 2023-08-08 PROCEDURE — C1769: CPT

## 2023-08-08 PROCEDURE — 93567 NJX CAR CTH SPRVLV AORTGRPHY: CPT

## 2023-08-08 PROCEDURE — 36415 COLL VENOUS BLD VENIPUNCTURE: CPT

## 2023-08-08 PROCEDURE — 80053 COMPREHEN METABOLIC PANEL: CPT

## 2023-08-08 PROCEDURE — C1725: CPT

## 2023-08-08 PROCEDURE — 80048 BASIC METABOLIC PNL TOTAL CA: CPT

## 2023-08-08 PROCEDURE — C1874: CPT

## 2023-08-08 PROCEDURE — 92972 PERQ TRLUML CORONRY LITHOTRP: CPT

## 2023-08-08 PROCEDURE — 93460 R&L HRT ART/VENTRICLE ANGIO: CPT | Mod: 59

## 2023-08-08 PROCEDURE — 93005 ELECTROCARDIOGRAM TRACING: CPT

## 2023-08-08 PROCEDURE — C1894: CPT

## 2023-08-08 PROCEDURE — C1887: CPT

## 2023-08-08 PROCEDURE — C1753: CPT

## 2023-08-08 PROCEDURE — C1760: CPT

## 2023-08-08 RX ORDER — CLOPIDOGREL BISULFATE 75 MG/1
1 TABLET, FILM COATED ORAL
Refills: 0 | DISCHARGE

## 2023-08-08 RX ORDER — LISINOPRIL 2.5 MG/1
1 TABLET ORAL
Refills: 0 | DISCHARGE

## 2023-08-08 RX ORDER — MAGNESIUM OXIDE 400 MG ORAL TABLET 241.3 MG
1 TABLET ORAL
Qty: 21 | Refills: 0
Start: 2023-08-08 | End: 2023-08-14

## 2023-08-08 RX ORDER — METOPROLOL TARTRATE 50 MG
1 TABLET ORAL
Qty: 90 | Refills: 0
Start: 2023-08-08 | End: 2023-11-05

## 2023-08-08 RX ORDER — CLOPIDOGREL BISULFATE 75 MG/1
1 TABLET, FILM COATED ORAL
Qty: 90 | Refills: 3
Start: 2023-08-08 | End: 2024-08-01

## 2023-08-08 RX ORDER — METOPROLOL TARTRATE 50 MG
100 TABLET ORAL DAILY
Refills: 0 | Status: DISCONTINUED | OUTPATIENT
Start: 2023-08-08 | End: 2023-08-08

## 2023-08-08 RX ORDER — ATORVASTATIN CALCIUM 80 MG/1
1 TABLET, FILM COATED ORAL
Refills: 0 | DISCHARGE

## 2023-08-08 RX ORDER — LISINOPRIL 2.5 MG/1
1 TABLET ORAL
Qty: 90 | Refills: 3
Start: 2023-08-08 | End: 2024-08-01

## 2023-08-08 RX ORDER — MAGNESIUM OXIDE 400 MG ORAL TABLET 241.3 MG
1 TABLET ORAL
Qty: 21 | Refills: 0 | DISCHARGE
Start: 2023-08-08 | End: 2023-08-14

## 2023-08-08 RX ORDER — ATORVASTATIN CALCIUM 80 MG/1
1 TABLET, FILM COATED ORAL
Qty: 90 | Refills: 3
Start: 2023-08-08 | End: 2024-08-01

## 2023-08-08 RX ORDER — CETIRIZINE HYDROCHLORIDE 10 MG/1
1 TABLET ORAL
Refills: 0 | DISCHARGE

## 2023-08-08 RX ORDER — ACETAMINOPHEN 500 MG
2 TABLET ORAL
Qty: 0 | Refills: 0 | DISCHARGE
Start: 2023-08-08

## 2023-08-08 RX ORDER — MAGNESIUM OXIDE 400 MG ORAL TABLET 241.3 MG
400 TABLET ORAL
Refills: 0 | Status: DISCONTINUED | OUTPATIENT
Start: 2023-08-08 | End: 2023-08-08

## 2023-08-08 RX ORDER — ACETAMINOPHEN 500 MG
650 TABLET ORAL EVERY 6 HOURS
Refills: 0 | Status: DISCONTINUED | OUTPATIENT
Start: 2023-08-08 | End: 2023-08-08

## 2023-08-08 RX ORDER — MAGNESIUM SULFATE 500 MG/ML
2 VIAL (ML) INJECTION ONCE
Refills: 0 | Status: COMPLETED | OUTPATIENT
Start: 2023-08-08 | End: 2023-08-08

## 2023-08-08 RX ORDER — METOPROLOL TARTRATE 50 MG
1 TABLET ORAL
Refills: 0 | DISCHARGE

## 2023-08-08 RX ORDER — LISINOPRIL 2.5 MG/1
5 TABLET ORAL DAILY
Refills: 0 | Status: CANCELLED | OUTPATIENT
Start: 2023-08-09 | End: 2023-08-08

## 2023-08-08 RX ADMIN — CLOPIDOGREL BISULFATE 75 MILLIGRAM(S): 75 TABLET, FILM COATED ORAL at 08:08

## 2023-08-08 RX ADMIN — LISINOPRIL 10 MILLIGRAM(S): 2.5 TABLET ORAL at 06:04

## 2023-08-08 RX ADMIN — Medication 81 MILLIGRAM(S): at 08:08

## 2023-08-08 RX ADMIN — ATORVASTATIN CALCIUM 80 MILLIGRAM(S): 80 TABLET, FILM COATED ORAL at 00:01

## 2023-08-08 RX ADMIN — Medication 25 GRAM(S): at 07:02

## 2023-08-08 RX ADMIN — Medication 650 MILLIGRAM(S): at 06:10

## 2023-08-08 RX ADMIN — Medication 650 MILLIGRAM(S): at 07:10

## 2023-08-08 RX ADMIN — MAGNESIUM OXIDE 400 MG ORAL TABLET 400 MILLIGRAM(S): 241.3 TABLET ORAL at 08:08

## 2023-08-08 NOTE — DISCHARGE NOTE PROVIDER - NSDCCPTREATMENT_GEN_ALL_CORE_FT
PRINCIPAL PROCEDURE  Procedure: Left heart cardiac cath  Findings and Treatment: No heavy lifting, driving, sex, tub baths, swimming, or any activity that submerges the lower half of the body in water for 48 hours.  Limited walking and stairs for 48 hours.    Change the bandaid after 24 hours and every 24 hours after that.  Keep the puncture site dry and covered with a bandaid until a scab forms.    Observe the site frequently.  If bleeding or a large lump (the size of a golf ball or bigger) occurs lie flat, apply continuous direct pressure just above the puncture site for at least 10 minutes, and notify your physician immediately.  If the bleeding cannot be controlled, call 911 immediately for assistance.  Notify your physician of pain, swelling or any drainage.    Notify your physician immediately if coldness, numbness, discoloration or pain in your foot occurs.

## 2023-08-08 NOTE — DISCHARGE NOTE NURSING/CASE MANAGEMENT/SOCIAL WORK - PATIENT PORTAL LINK FT
You can access the FollowMyHealth Patient Portal offered by [facility] by registering at the following website: [email or web address]. By joining [employer]’s FollowMyHealth portal, you will also be able to view your health information using other applications (apps) compatible with our system.

## 2023-08-08 NOTE — PROGRESS NOTE ADULT - SUBJECTIVE AND OBJECTIVE BOX
Nurse Practitioner Progress note:   Pt 1Day s/p R & LHC with successful PCI, rotational arthrectomy, intervascular shockwave and GERALD x 1 (Emerge 6.0 x 15 mm)  to pRCA with Dr Mora.      Patient feels well.  Denies chest pain, shortness of breath, dizziness or palpitations at this time. This morning however at 0715 immediately after the magnesium replacement pt c/o feeling dizzy and his B/P dropped to 90/53.  Pt had received Lisinopril 10 mg at 0600. Magnesium was stopped and pt was given 250 cc NS bolus.  BP resumed to 109/59. Pt given Magnesium oxide 400 mg po with breakfast.      Pt A&O x 3  Lungs CTA  S1S2 np MRG  VSS   Left groin procedure site with mynx closure CDI.  No bleeding, no hematoma, site soft, non tender, positive pedal pulses bilaterally.    T(C): 36.6 (08-08-23 @ 05:45), Max: 36.6 (08-07-23 @ 13:57)  HR: 83 (08-08-23 @ 05:45) (64 - 89)  BP: 127/67 (08-08-23 @ 05:45) (113/67 - 159/76)  RR: 12 (08-08-23 @ 05:45) (8 - 28)  SpO2: 98% (08-08-23 @ 05:45) (98% - 100%)    CBC Full  -  ( 08 Aug 2023 05:48 )  WBC Count : 8.17 K/uL  RBC Count : 4.09 M/uL  Hemoglobin : 12.9 g/dL  Hematocrit : 38.2 %  Platelet Count - Automated : 183 K/uL  Mean Cell Volume : 93.4 fl  Mean Cell Hemoglobin : 31.5 pg  Mean Cell Hemoglobin Concentration : 33.8 gm/dL      08-08    137  |  102  |  17.5  ----------------------------<  113<H>  4.0   |  23.0  |  0.84    Ca    9.0      08 Aug 2023 05:48  Mg     1.5     08-08    TPro  5.9<L>  /  Alb  3.7  /  TBili  0.7  /  DBili  x   /  AST  18  /  ALT  18  /  AlkPhos  85  08-08            MEDICATIONS  (STANDING):  aspirin  chewable 81 milliGRAM(s) Oral daily  atorvastatin 80 milliGRAM(s) Oral at bedtime  chlorhexidine 4% Liquid 1 Application(s) Topical Once  clopidogrel Tablet 75 milliGRAM(s) Oral daily  folic acid 1 milliGRAM(s) Oral daily  lisinopril 10 milliGRAM(s) Oral daily  magnesium oxide 400 milliGRAM(s) Oral three times a day with meals  metoprolol succinate  milliGRAM(s) Oral daily    MEDICATIONS  (PRN):  acetaminophen     Tablet .. 650 milliGRAM(s) Oral every 6 hours PRN Moderate Pain (4 - 6)    EKG: Stat EKG revealed a LBBB with 1st degree HB unchanged from baseline  No events on telemetry overnight.    HPI:  Narrative: This is a 79 yo male with a PMHx of CVA, HTN, HLD and DM with c/o sharp exertional CP and WHEELER.  He has a known  of the LAD from previous catheterization.  His RCA on that cath was no visualized.  A whole heart CA score from 7/5/23 was noted to be 4693 with an Agatston's aortic score of 2115, and was R heart dominant. History of exposure to agent orange while in the .    (07 Aug 2023 12:35)    now s/p [facility] with successful PCI and GERALD to    ASSESSMENT/PLAN:    Coronary artery disease  -Admitted to telemetry overnight for observation  -VS, labs, diet, activity reviewed.  Pt has been OOB and ambulating  -IV hydration  -Encourage PO fluids  -Aspirin 81 mg PO daily  -Plavix 75mg PO daily  -Metoprolol 200 mg PO daily decreased to 100 mg daily  -Lisinopril 10 mg PO daily decreased to 5 mg   -atorvastatin 80 mg PO QHS   -Plan of care discussed with patient, family and MD  -likely d/c in AM if patient remains stable overnight  -NP to see in AM to evaluate labs, EKG and procedure site check  -Follow-up with attending MD Dr Mora  within 1 week  -Discussed therapeutic lifestyle changes to reduce risk factors such as following a cardiac diet, weight loss, maintaining a healthy weight, exercise, smoking cessation, medication compliance, and regular follow-up  with MD to know your numbers (BP, cholesterol, weight, and glucose)

## 2023-08-08 NOTE — DISCHARGE NOTE NURSING/CASE MANAGEMENT/SOCIAL WORK - NSDCPEFALRISK_GEN_ALL_CORE
For information on Fall & Injury Prevention, visit: [email or web address] OR  [email or web address] OR  https://www.cdc.gov/steadi/patient.html

## 2023-08-08 NOTE — DISCHARGE NOTE PROVIDER - NSDCMRMEDTOKEN_GEN_ALL_CORE_FT
acetaminophen 325 mg oral tablet: 2 tab(s) orally every 6 hours As needed Moderate Pain (4 - 6)  Aspir 81 oral delayed release tablet: 1 orally once a day  atorvastatin 80 mg oral tablet: 1 tab(s) orally once a day (at bedtime)  Chem7 with serum magnesium: Please send results to Dr Geronimo Mora Fax # [phone]  clopidogrel 75 mg oral tablet: 1 tab(s) orally once a day  folic acid 1 mg oral tablet: 1 orally once a day  lisinopril 5 mg oral tablet: 1 tab(s) orally once a day  magnesium oxide 400 mg oral tablet: 1 tab(s) orally 3 times a day (with meals)  metoprolol succinate 100 mg oral tablet, extended release: 1 tab(s) orally once a day  pioglitazone 30 mg oral tablet: 1 orally once a day

## 2023-08-08 NOTE — DISCHARGE NOTE PROVIDER - HOSPITAL COURSE
Narrative: This is a 77 yo male with a PMHx of CVA, HTN, HLD and DM with c/o sharp exertional CP and WHEELER.  He has a known  of the LAD from previous catheterization.  His RCA on that cath was no visualized.  A whole heart CA score from 7/5/23 was noted to be 4693 with an Agatston's aortic score of 2115, and was R heart dominant. History of exposure to agent orange while in the .    (07 Aug 2023 12:35)    now s/p [facility] with successful PCI and GERALD to    ASSESSMENT/PLAN:    Coronary artery disease  -Admitted to telemetry overnight for observation  -VS, labs, diet, activity reviewed.  Pt has been OOB and ambulating  -IV hydration  -Encourage PO fluids  -Aspirin 81 mg PO daily  -Plavix 75mg PO daily  -Metoprolol 200 mg PO daily decreased to 100 mg daily  -Lisinopril 10 mg PO daily decreased to 5 mg   -atorvastatin 80 mg PO QHS   -Plan of care discussed with patient, family and MD  -likely d/c in AM if patient remains stable overnight  -NP to see in AM to evaluate labs, EKG and procedure site check  -Follow-up with attending MD Dr Mora  within 1 week  -Discussed therapeutic lifestyle changes to reduce risk factors such as following a cardiac diet, weight loss, maintaining a healthy weight, exercise, smoking cessation, medication compliance, and regular follow-up  with MD to know your numbers (BP, cholesterol, weight, and glucose)    Narrative: This is a 77 yo male with a PMHx of CVA, HTN, HLD and DM with c/o sharp exertional CP and WHEELER.  He has a known  of the LAD from previous catheterization.  His RCA on that cath was no visualized.  A whole heart CA score from 7/5/23 was noted to be 4693 with an Agatston's aortic score of 2115, and was R heart dominant. History of exposure to agent orange while in the .    (07 Aug 2023 12:35)    now s/p [facility] with successful PCI and GERALD to    ASSESSMENT/PLAN:    Coronary artery disease  -Admitted to telemetry overnight for observation  -VS, labs, diet, activity reviewed.  Pt has been OOB and ambulating  -IV hydration  -Encourage PO fluids  -Aspirin 81 mg PO daily  -Plavix 75mg PO daily  -Metoprolol 200 mg PO daily decreased to 100 mg daily  -Lisinopril 10 mg PO daily decreased to 5 mg   -atorvastatin 80 mg PO QHS   -Plan of care discussed with patient, family and MD  - cardiac rehab info provided/referral and communication to cardiac rehab completed  -likely d/c in AM if patient remains stable overnight  -NP to see in AM to evaluate labs, EKG and procedure site check  -Follow-up with attending MD Dr Mora  within 1 week  -Discussed therapeutic lifestyle changes to reduce risk factors such as following a cardiac diet, weight loss, maintaining a healthy weight, exercise, smoking cessation, medication compliance, and regular follow-up  with MD to know your numbers (BP, cholesterol, weight, and glucose)

## 2023-08-08 NOTE — DISCHARGE NOTE PROVIDER - CARE PROVIDER_API CALL
Geronimo Mora  Interventional Cardiology  [address] [place] [address]  Phone: [phone]  Fax: [phone]  Follow Up Time: 1 week

## 2023-08-08 NOTE — DISCHARGE NOTE PROVIDER - NSDCCPCAREPLAN_GEN_ALL_CORE_FT
PRINCIPAL DISCHARGE DIAGNOSIS  Diagnosis: CAD (coronary artery disease)  Assessment and Plan of Treatment: Coronary artery disease is the buildup of plaque in the arteries that supply oxygen-rich blood to your heart. Plaque causes a narrowing or blockage that could result in a heart attack. Symptoms include chest pain or discomfort, shortness of breath, dizziness, palpitations, fatigue or reduced exercise tolerance. .  Go to the ED with any acute onset of chest pain, palpitations, shortness of breath or dizziness. Do NOT miss a dose or stop taking your Aspirin and ***, these medications keep your stent open and prevent a heart attack. If anyone tells you to stop these medications, speak to your cardiologist immediately.  Managing risk factors will help keep your stent open and prevent future blockages, risk factors may include: high blood pressure, high cholesterol, diabetes, obesity, sedentary life style and smoking.    Your diet should be low in fat, cholesterol, salt and carbohydrates, increase fruits (caution if diabetic), vegetables and whole grains/fiber rich foods.   Take all your cardiac  medications as prescribed.    Exercise is a very important factor in heart health. Once your post procedure restrictions have passed, you should engage in heart healthy, aerobic exercise. Be sure to have clearance from your cardiologist. Cardiac rehab programs could be extremely beneficial and your cardiologist could help set this up.   Follow up with your cardiologist within 1-2 weeks after your procedure.   Call your cardiologist or our unit ([phone]) with any questions or concerns that may arise.

## 2023-10-03 PROBLEM — I63.9 CEREBRAL INFARCTION, UNSPECIFIED: Chronic | Status: ACTIVE | Noted: 2023-08-07

## 2023-10-03 PROBLEM — R93.1 ABNORMAL FINDINGS ON DIAGNOSTIC IMAGING OF HEART AND CORONARY CIRCULATION: Chronic | Status: ACTIVE | Noted: 2023-08-07

## 2023-10-03 PROBLEM — M51.36 OTHER INTERVERTEBRAL DISC DEGENERATION, LUMBAR REGION: Chronic | Status: ACTIVE | Noted: 2023-08-07

## 2023-10-03 PROBLEM — Z86.79 PERSONAL HISTORY OF OTHER DISEASES OF THE CIRCULATORY SYSTEM: Chronic | Status: ACTIVE | Noted: 2023-08-07

## 2023-10-05 ENCOUNTER — OUTPATIENT (OUTPATIENT)
Dept: OUTPATIENT SERVICES | Facility: HOSPITAL | Age: 78
LOS: 1 days | End: 2023-10-05
Payer: OTHER GOVERNMENT

## 2023-10-05 DIAGNOSIS — Z98.890 OTHER SPECIFIED POSTPROCEDURAL STATES: Chronic | ICD-10-CM

## 2023-10-05 DIAGNOSIS — Z01.818 ENCOUNTER FOR OTHER PREPROCEDURAL EXAMINATION: ICD-10-CM

## 2023-10-05 LAB
ALBUMIN SERPL ELPH-MCNC: 4.1 G/DL — SIGNIFICANT CHANGE UP (ref 3.3–5.2)
ALP SERPL-CCNC: 109 U/L — SIGNIFICANT CHANGE UP (ref 40–120)
ALT FLD-CCNC: 17 U/L — SIGNIFICANT CHANGE UP
ANION GAP SERPL CALC-SCNC: 13 MMOL/L — SIGNIFICANT CHANGE UP (ref 5–17)
AST SERPL-CCNC: 19 U/L — SIGNIFICANT CHANGE UP
BASOPHILS # BLD AUTO: 0.04 K/UL — SIGNIFICANT CHANGE UP (ref 0–0.2)
BASOPHILS NFR BLD AUTO: 0.7 % — SIGNIFICANT CHANGE UP (ref 0–2)
BILIRUB SERPL-MCNC: 0.4 MG/DL — SIGNIFICANT CHANGE UP (ref 0.4–2)
BLD GP AB SCN SERPL QL: SIGNIFICANT CHANGE UP
BUN SERPL-MCNC: 24 MG/DL — HIGH (ref 8–20)
CALCIUM SERPL-MCNC: 9.2 MG/DL — SIGNIFICANT CHANGE UP (ref 8.4–10.5)
CHLORIDE SERPL-SCNC: 100 MMOL/L — SIGNIFICANT CHANGE UP (ref 96–108)
CO2 SERPL-SCNC: 25 MMOL/L — SIGNIFICANT CHANGE UP (ref 22–29)
CREAT SERPL-MCNC: 0.97 MG/DL — SIGNIFICANT CHANGE UP (ref 0.5–1.3)
EGFR: 81 ML/MIN/1.73M2 — SIGNIFICANT CHANGE UP
EOSINOPHIL # BLD AUTO: 0.27 K/UL — SIGNIFICANT CHANGE UP (ref 0–0.5)
EOSINOPHIL NFR BLD AUTO: 4.5 % — SIGNIFICANT CHANGE UP (ref 0–6)
GLUCOSE SERPL-MCNC: 283 MG/DL — HIGH (ref 70–99)
HCT VFR BLD CALC: 37.7 % — LOW (ref 39–50)
HGB BLD-MCNC: 12.5 G/DL — LOW (ref 13–17)
IMM GRANULOCYTES NFR BLD AUTO: 0.2 % — SIGNIFICANT CHANGE UP (ref 0–0.9)
LYMPHOCYTES # BLD AUTO: 1.96 K/UL — SIGNIFICANT CHANGE UP (ref 1–3.3)
LYMPHOCYTES # BLD AUTO: 32.9 % — SIGNIFICANT CHANGE UP (ref 13–44)
MAGNESIUM SERPL-MCNC: 1.4 MG/DL — LOW (ref 1.8–2.6)
MCHC RBC-ENTMCNC: 32.3 PG — SIGNIFICANT CHANGE UP (ref 27–34)
MCHC RBC-ENTMCNC: 33.2 GM/DL — SIGNIFICANT CHANGE UP (ref 32–36)
MCV RBC AUTO: 97.4 FL — SIGNIFICANT CHANGE UP (ref 80–100)
MONOCYTES # BLD AUTO: 0.59 K/UL — SIGNIFICANT CHANGE UP (ref 0–0.9)
MONOCYTES NFR BLD AUTO: 9.9 % — SIGNIFICANT CHANGE UP (ref 2–14)
NEUTROPHILS # BLD AUTO: 3.08 K/UL — SIGNIFICANT CHANGE UP (ref 1.8–7.4)
NEUTROPHILS NFR BLD AUTO: 51.8 % — SIGNIFICANT CHANGE UP (ref 43–77)
PLATELET # BLD AUTO: 197 K/UL — SIGNIFICANT CHANGE UP (ref 150–400)
POTASSIUM SERPL-MCNC: 5.1 MMOL/L — SIGNIFICANT CHANGE UP (ref 3.5–5.3)
POTASSIUM SERPL-SCNC: 5.1 MMOL/L — SIGNIFICANT CHANGE UP (ref 3.5–5.3)
PROT SERPL-MCNC: 6.6 G/DL — SIGNIFICANT CHANGE UP (ref 6.6–8.7)
RBC # BLD: 3.87 M/UL — LOW (ref 4.2–5.8)
RBC # FLD: 13.6 % — SIGNIFICANT CHANGE UP (ref 10.3–14.5)
SODIUM SERPL-SCNC: 138 MMOL/L — SIGNIFICANT CHANGE UP (ref 135–145)
WBC # BLD: 5.95 K/UL — SIGNIFICANT CHANGE UP (ref 3.8–10.5)
WBC # FLD AUTO: 5.95 K/UL — SIGNIFICANT CHANGE UP (ref 3.8–10.5)

## 2023-10-05 PROCEDURE — 93005 ELECTROCARDIOGRAM TRACING: CPT

## 2023-10-05 PROCEDURE — 93010 ELECTROCARDIOGRAM REPORT: CPT

## 2023-10-05 PROCEDURE — G0463: CPT

## 2023-10-05 NOTE — H&P PST ADULT - HISTORY OF PRESENT ILLNESS
This is a 79 yo male with a PMHx of CVA, HTN, HLD and DM with c/o sharp exertional CP and WHEELER.  He has a known  of the LAD from previous catheterization.  His RCA on that cath was no visualized.  A whole heart CA score from 7/5/23 was noted to be 4693 with an Agatston's aortic score of 2115, and was R heart dominant. History of exposure to agent orange while in the .   HPI:     This is a 77 yo male with a PMHx of CVA, HTN, HLD and DM, History of exposure to agent orange while in the , known  of the LAD from previous catheterization.  His RCA on that cath was no visualized.     Patient s/p R & LHC on 08/07/23 with with successful PCI, rotational arthrectomy, intervascular shockwave and GERALD x 1 (Emerge 6.0 x 15 mm)  to pRCA with Dr Mora.    Symptoms:        Angina (Class):        Ischemic Symptoms:     Heart Failure: No       Systolic/Diastolic/Combined:        NYHA Class (within 2 weeks):     Assessment of LVEF (Must be within 6 months):       EF: 55%       Assessed by: Cath       Date: 08/07/23    Prior Cardiac Interventions:       PCI's (Date, Stents, Vessels):      < from: Cardiac Catheterization (08.07.23 @ 17:23) >  2.    Arterial and Venous Access - Left Femoral   3.    RHC   4.    Diagnostic Coronary Angiography   5.    Left Heart Cath   6.    Aortogram   7.    Temporary Pacemaker   8.    PCI: Rotational Atherectomy   9.    PCI: Intravascular Lithotripsy   10.   IVUS   11.   Perclose   12.   Mynx     100%  of mid LAD   Significant stenosis of a small OM1   95% stenosis of the ostium of the RCA: Treated with rotational  atherectomy, intravascular lithotripsy and GERALD stenting with  IVUS guidance.   Recommendations:   Aspirin and Plavix     LM   Distal left main: There is a 30 % stenosis.      LAD   Proximal left anterior descending: The segment is moderately  calcified. Angiography shows mild atherosclerosis. Mid left  anterior descending: The segment is severely calcified. The distal  vessel is supplied by limited collaterals from the First  diagonal. There is a 100 % stenosis.      CX   First obtuse marginal: The segment is small. There is a 90 % stenosis.      RCA   Ostial right coronary artery: The segment is severely calcified. There  is a 95 % stenosis.    Aorta   The root exhibits normal size. Ascending aorta: There is no aortic  dilation.    Left Heart Cath   Left ventricular function was assessed and demonstrates normal LV wall  motion. Global left ventricular function is normal.  Ejection fraction is 55%. LHC performed: Aortic valve crossed and left  ventricular pressures were obtained.  Valves   Aortic Valve: There is mild aortic stenosis. There is mild (1+) aortic  insufficiency.  Mitral Valve: The mitral valve exhibits trivial (less than 1+)  regurgitation       PCI's: Cardiac Catheterization on 7/12/23 @ [facility]    CABG (Date, Grafts): None    Noninvasive Testing:   Stress Test: Date:        Protocol:        Duration of Exercise:        Symptoms:        EKG Changes:        DTS:        Myocardial Imaging:        Risk Assessment (Low, Medium, High):     Echo (Date, Findings):     Echo: Done at the [facility]     Noninvasive Testing:   Echo: Done at the [facility]         Antianginal Therapies:        Beta Blockers:         Calcium Channel Blockers:        Long Acting Nitrates:        Ranexa:       Associated Risk Factors:        Cerebrovascular Disease: N/A       Chronic Lung Disease: N/A       Peripheral Arterial Disease: N/A       Chronic Kidney Disease (if yes, what is GFR): N/A       Uncontrolled Diabetes (if yes, what is HgbA1C or FBS): N/A       Poorly Controlled Hypertension (if yes, what is SBP): N/A       Morbid Obesity (if yes, what is BMI): N/A       History of Recent Ventricular Arrhythmia: N/A       Inability to Ambulate Safely: N/A       Need for Therapeutic Anticoagulation: N/A       Antiplatelet or Contrast Allergy: N/A               HPI:     This is a 77 yo male with a PMHx of [place], mild sleep apnea (on 2L O2NC/mt  AT NIGHT), CVA 17 years ago with residual of LUE less strength (shaking of left hand while holding things), cervical and lumbar degenerative disease, radiculopathy, Left knee meniscus surgery, HTN, HLD and DM (from 1999) History of exposure to agent orange while in the  (orange is a weed spray) known  of the LAD from cardiac cath On 07/23 at [facility].   His RCA on that cath was no visualized, Subsequently patient was referred for Ct surgery eval, but patient opted out CT surgery evaluation,  followed  up with DR Mora, Patient had repeat cardiac  cath  (R & LHC )on 08/07/23 with DR Mora.  His Aortic stenosis is not severe, The RCA is large and dominant vessel which nba treated with atherecetomy and stenting The LAD has , but small in caliber. The LCX Is also so small with 90% stenosis of OM1.  During his last OP cardiology office visit on 10/3/23, his ekg shows junctional  tachycardia and his Bp was on the low side. Subsequently lisinopril discontinued and plan made to revascularize  of LAD.  patient with c/o WHEELER, states that he doesn't walk more than a block, dosn't exercise regularly. Ptaient has been compliant with his DAPT and other medications. Has been doing well after the stenting in 08/23.  Denies HA, dizziness, CP, palpitations.  Patient now presents to [facility] PST for periprocedural evaluation anticipating Left heart cath with PCI to LAD on 10/11/23 with DR Mora.    Off note: patient resides in [place], [place] as well as [place], [place] (alternates between 6 months in [place] and 6 months in [place] samira year)     Symptoms:        Angina (Class): 2       Ischemic Symptoms: WHEELER    Heart Failure: No       Systolic/Diastolic/Combined:        NYHA Class (within 2 weeks):     Assessment of LVEF (Must be within 6 months):       EF: 55%       Assessed by: Cath       Date: 08/07/23    Prior Cardiac Interventions:       PCI's (Date, Stents, Vessels):      < from: Cardiac Catheterization (08.07.23 @ 17:23) >  2.    Arterial and Venous Access - Left Femoral   3.    RHC   4.    Diagnostic Coronary Angiography   5.    Left Heart Cath   6.    Aortogram   7.    Temporary Pacemaker   8.    PCI: Rotational Atherectomy   9.    PCI: Intravascular Lithotripsy   10.   IVUS   11.   Perclose   12.   Mynx     100%  of mid LAD   Significant stenosis of a small OM1   95% stenosis of the ostium of the RCA: Treated with rotational  atherectomy, intravascular lithotripsy and GERALD stenting with  IVUS guidance.   Recommendations:   Aspirin and Plavix     LM   Distal left main: There is a 30 % stenosis.      LAD   Proximal left anterior descending: The segment is moderately  calcified. Angiography shows mild atherosclerosis. Mid left  anterior descending: The segment is severely calcified. The distal  vessel is supplied by limited collaterals from the First  diagonal. There is a 100 % stenosis.      CX   First obtuse marginal: The segment is small. There is a 90 % stenosis.      RCA   Ostial right coronary artery: The segment is severely calcified. There  is a 95 % stenosis.    Aorta   The root exhibits normal size. Ascending aorta: There is no aortic  dilation.    Left Heart Cath   Left ventricular function was assessed and demonstrates normal LV wall  motion. Global left ventricular function is normal.  Ejection fraction is 55%. LHC performed: Aortic valve crossed and left  ventricular pressures were obtained.  Valves   Aortic Valve: There is mild aortic stenosis. There is mild (1+) aortic  insufficiency.  Mitral Valve: The mitral valve exhibits trivial (less than 1+)  regurgitation       PCI's: Cardiac Catheterization on 7/12/23 @ [facility]    CABG (Date, Grafts): None    Noninvasive Testing:     CT coronary heart: Calcium score: 4693    Stress Test: Date: None recently           Long Acting Nitrates:     Echo: done at [facility], report not available       Anti anginal/ [place] rrhythmicv therapy    BB: Metoprolol succ 100mg po daily  CCB: none  Ranexa: none  Antiplatelet therapy: ASA/Plavix  AC: None    Associated Risk Factors:        Cerebrovascular Disease: N/A       Chronic Lung Disease: Mild sleep apnea, on 2L NC o2at night       Peripheral Arterial Disease: N/A       Chronic Kidney Disease (if yes, what is GFR): N/A       Uncontrolled Diabetes (if yes, what is HgbA1C or FBS): N/A       Poorly Controlled Hypertension (if yes, what is SBP): N/A       Morbid Obesity (if yes, what is BMI): N/A       History of Recent Ventricular Arrhythmia: N/A       Inability to Ambulate Safely: N/A       Need for Therapeutic Anticoagulation: N/A       Antiplatelet or Contrast Allergy: N/A      Vitals:   BP: 130/72 MM OF HG  HR: 64bpm  Temp: 97F  O2sat : 98% on RA       Labs:

## 2023-10-05 NOTE — H&P PST ADULT - ASSESSMENT
Risk Assessments:  ASA: 3  Mallampati:  GFR:   Cr:  BRA:      Impression:    Plan:    -plan for LHC with PCI   -patient seen and examined  -confirmed appropriate NPO duration  -ECG and Labs reviewed  -Aspirin 81mg po pre-cath  -procedure discussed with patient; risks and benefits explained, questions answered  -consent obtained by attending IC   This is a 79 yo male with a PMHx of [place], mild sleep apnea (on 2L O2NC/mt  AT NIGHT), CVA 17 years ago with residual of LUE less strength (shaking of left hand while holding things), cervical and lumbar degenerative disease, radiculopathy, Left knee meniscus surgery, HTN, HLD and DM (from 1999) History of exposure to agent orange while in the  (orange is a weed spray) known  of the LAD from cardiac cath On 07/23 at [facility].   His RCA on that cath was no visualized, Subsequently patient was referred for Ct surgery eval, but patient opted out CT surgery evaluation,  followed  up with DR Mora, Patient had repeat cardiac  cath  (R & LHC )on 08/07/23 with DR Mora.  His Aortic stenosis is not severe, The RCA is large and dominant vessel which nba treated with atherecetomy and stenting The LAD has , but small in caliber. The LCX Is also so small with 90% stenosis of OM1.  During his last OP cardiology office visit on 10/3/23, his ekg shows junctional  tachycardia and his Bp was on the low side. Subsequently lisinopril discontinued and plan made to revascularize  of LAD.  patient with c/o WHEELER, states that he doesn't walk more than a block, dosn't exercise regularly. Ptaient has been compliant with his DAPT and other medications. Has been doing well after the stenting in 08/23.  Denies HA, dizziness, CP, palpitations.  Patient now presents to [facility] PST for periprocedural evaluation anticipating Left heart cath with PCI to  of LAD on 10/11/23 with DR Mora.      Risk Assessments:  ASA: 3  Mallampati: 3  GFR:   Cr:  BRA:      Impression: Obstructive CAD,  of LAD, he LCX Is also so small with 90% stenosis of OM1.  requires PCI to LAD    Plan:    -plan for LHC with PCI TO LAD  -Preferred Access: RFA/LFA  -patient seen and examined  -Instructed to have light breakfast as patient's procedure scheduled for afternoon, To be confirmed appropriate NPO duration  -ECG and Labs reviewed  -Aspirin 81mg po pre-cath to be ordered  -Normal saline 250ml iv bolus x1 to prevent post procedure JESUS   -procedure discussed with patient; risks and benefits explained, questions answered, written and verbal instructions given and patient and his spouse verbalized understanding.  -consent to be obtained by interventional Cardiology Attending  attending DR Mora   This is a 77 yo male with a PMHx of [place], mild sleep apnea (on 2L O2NC/mt  AT NIGHT), CVA 17 years ago with residual of LUE less strength (shaking of left hand while holding things), cervical and lumbar degenerative disease, radiculopathy, Left knee meniscus surgery, HTN, HLD and DM (from 1999) History of exposure to agent orange while in the  (orange is a weed spray) known  of the LAD from cardiac cath On 07/23 at [facility].   His RCA on that cath was no visualized, Subsequently patient was referred for Ct surgery eval, but patient opted out CT surgery evaluation,  followed  up with DR Mora, Patient had repeat cardiac  cath  (R & LHC )on 08/07/23 with DR Mora.  His Aortic stenosis is not severe, The RCA is large and dominant vessel which nba treated with atherectomy and stenting The LAD has , but small in caliber. The LCX Is also so small with 90% stenosis of OM1.  During his last OP cardiology office visit on 10/3/23, his ekg shows junctional  tachycardia and his Bp was on the low side. Subsequently lisinopril discontinued and plan made to revascularize  of LAD.  patient with c/o WHEELER, states that he doesn't walk more than a block, dosn't exercise regularly. Patient has been compliant with his DAPT and other medications. Has been doing well after the stenting in 08/23.  Patient now presents to [facility] PST for periprocedural evaluation anticipating Left heart cath with PCI to  of LAD on 10/11/23 with DR Mora.      Risk Assessments:  ASA: 3  Mallampati: 3  GFR: 81  Cr: 0.97  BRA: 0.9%      Impression: Obstructive CAD,  of LAD, he LCX Is also so small with 90% stenosis of OM1.  requires PCI to LAD    Plan:    -plan for LHC with PCI TO LAD  -Preferred Access: RFA/LFA  -patient seen and examined  -Instructed to have light breakfast as patient's procedure scheduled for afternoon, To be confirmed appropriate NPO duration  -ECG and Labs reviewed  -Aspirin 81mg po pre-cath to be ordered  -Normal saline 250ml iv bolus x1 to prevent post procedure JESUS   -procedure discussed with patient; risks and benefits explained, questions answered, written and verbal instructions given and patient and his spouse verbalized understanding.  -consent to be obtained by interventional Cardiology Attending  attending DR Mora

## 2023-10-10 RX ORDER — CHLORHEXIDINE GLUCONATE 213 G/1000ML
1 SOLUTION TOPICAL ONCE
Refills: 0 | Status: DISCONTINUED | OUTPATIENT
Start: 2023-10-11 | End: 2023-10-12

## 2023-10-11 ENCOUNTER — INPATIENT (INPATIENT)
Facility: HOSPITAL | Age: 78
LOS: 0 days | Discharge: HOME | DRG: 324 | End: 2023-10-12
Attending: INTERNAL MEDICINE | Admitting: INTERNAL MEDICINE
Payer: OTHER GOVERNMENT

## 2023-10-11 VITALS
OXYGEN SATURATION: 99 % | HEIGHT: 72 IN | WEIGHT: 246.04 LBS | RESPIRATION RATE: 16 BRPM | TEMPERATURE: 98 F | HEART RATE: 70 BPM | SYSTOLIC BLOOD PRESSURE: 160 MMHG | DIASTOLIC BLOOD PRESSURE: 75 MMHG

## 2023-10-11 DIAGNOSIS — I25.82 CHRONIC TOTAL OCCLUSION OF CORONARY ARTERY: ICD-10-CM

## 2023-10-11 DIAGNOSIS — Z98.890 OTHER SPECIFIED POSTPROCEDURAL STATES: Chronic | ICD-10-CM

## 2023-10-11 LAB
ANION GAP SERPL CALC-SCNC: 11 MMOL/L — SIGNIFICANT CHANGE UP (ref 5–17)
BUN SERPL-MCNC: 17.6 MG/DL — SIGNIFICANT CHANGE UP (ref 8–20)
CALCIUM SERPL-MCNC: 9.2 MG/DL — SIGNIFICANT CHANGE UP (ref 8.4–10.5)
CHLORIDE SERPL-SCNC: 100 MMOL/L — SIGNIFICANT CHANGE UP (ref 96–108)
CO2 SERPL-SCNC: 26 MMOL/L — SIGNIFICANT CHANGE UP (ref 22–29)
CREAT SERPL-MCNC: 0.88 MG/DL — SIGNIFICANT CHANGE UP (ref 0.5–1.3)
EGFR: 89 ML/MIN/1.73M2 — SIGNIFICANT CHANGE UP
GLUCOSE SERPL-MCNC: 130 MG/DL — HIGH (ref 70–99)
MAGNESIUM SERPL-MCNC: 1.6 MG/DL — SIGNIFICANT CHANGE UP (ref 1.6–2.6)
POTASSIUM SERPL-MCNC: 3.9 MMOL/L — SIGNIFICANT CHANGE UP (ref 3.5–5.3)
POTASSIUM SERPL-SCNC: 3.9 MMOL/L — SIGNIFICANT CHANGE UP (ref 3.5–5.3)
SODIUM SERPL-SCNC: 137 MMOL/L — SIGNIFICANT CHANGE UP (ref 135–145)

## 2023-10-11 PROCEDURE — 93010 ELECTROCARDIOGRAM REPORT: CPT | Mod: 76

## 2023-10-11 RX ORDER — DEXTROSE 50 % IN WATER 50 %
25 SYRINGE (ML) INTRAVENOUS ONCE
Refills: 0 | Status: DISCONTINUED | OUTPATIENT
Start: 2023-10-11 | End: 2023-10-12

## 2023-10-11 RX ORDER — CLOPIDOGREL BISULFATE 75 MG/1
75 TABLET, FILM COATED ORAL DAILY
Refills: 0 | Status: DISCONTINUED | OUTPATIENT
Start: 2023-10-12 | End: 2023-10-12

## 2023-10-11 RX ORDER — POTASSIUM CHLORIDE 20 MEQ
20 PACKET (EA) ORAL ONCE
Refills: 0 | Status: COMPLETED | OUTPATIENT
Start: 2023-10-11 | End: 2023-10-11

## 2023-10-11 RX ORDER — METOPROLOL TARTRATE 50 MG
100 TABLET ORAL AT BEDTIME
Refills: 0 | Status: DISCONTINUED | OUTPATIENT
Start: 2023-10-11 | End: 2023-10-12

## 2023-10-11 RX ORDER — SODIUM CHLORIDE 9 MG/ML
1000 INJECTION, SOLUTION INTRAVENOUS
Refills: 0 | Status: DISCONTINUED | OUTPATIENT
Start: 2023-10-11 | End: 2023-10-12

## 2023-10-11 RX ORDER — MAGNESIUM OXIDE 400 MG ORAL TABLET 241.3 MG
400 TABLET ORAL EVERY 4 HOURS
Refills: 0 | Status: COMPLETED | OUTPATIENT
Start: 2023-10-11 | End: 2023-10-11

## 2023-10-11 RX ORDER — PIOGLITAZONE HYDROCHLORIDE 15 MG/1
1 TABLET ORAL
Refills: 0 | DISCHARGE

## 2023-10-11 RX ORDER — FOLIC ACID 0.8 MG
1 TABLET ORAL
Refills: 0 | DISCHARGE

## 2023-10-11 RX ORDER — ATORVASTATIN CALCIUM 80 MG/1
80 TABLET, FILM COATED ORAL AT BEDTIME
Refills: 0 | Status: DISCONTINUED | OUTPATIENT
Start: 2023-10-11 | End: 2023-10-12

## 2023-10-11 RX ORDER — ASPIRIN/CALCIUM CARB/MAGNESIUM 324 MG
81 TABLET ORAL ONCE
Refills: 0 | Status: COMPLETED | OUTPATIENT
Start: 2023-10-11 | End: 2023-10-11

## 2023-10-11 RX ORDER — ASPIRIN/CALCIUM CARB/MAGNESIUM 324 MG
81 TABLET ORAL DAILY
Refills: 0 | Status: DISCONTINUED | OUTPATIENT
Start: 2023-10-12 | End: 2023-10-12

## 2023-10-11 RX ORDER — GLUCAGON INJECTION, SOLUTION 0.5 MG/.1ML
1 INJECTION, SOLUTION SUBCUTANEOUS ONCE
Refills: 0 | Status: DISCONTINUED | OUTPATIENT
Start: 2023-10-11 | End: 2023-10-12

## 2023-10-11 RX ORDER — DEXTROSE 50 % IN WATER 50 %
15 SYRINGE (ML) INTRAVENOUS ONCE
Refills: 0 | Status: DISCONTINUED | OUTPATIENT
Start: 2023-10-11 | End: 2023-10-12

## 2023-10-11 RX ORDER — CETIRIZINE HYDROCHLORIDE 10 MG/1
1 TABLET ORAL
Refills: 0 | DISCHARGE

## 2023-10-11 RX ORDER — ACETAMINOPHEN 500 MG
1000 TABLET ORAL ONCE
Refills: 0 | Status: COMPLETED | OUTPATIENT
Start: 2023-10-11 | End: 2023-10-11

## 2023-10-11 RX ORDER — FOLIC ACID 0.8 MG
1 TABLET ORAL DAILY
Refills: 0 | Status: DISCONTINUED | OUTPATIENT
Start: 2023-10-12 | End: 2023-10-12

## 2023-10-11 RX ORDER — ASPIRIN/CALCIUM CARB/MAGNESIUM 324 MG
1 TABLET ORAL
Refills: 0 | DISCHARGE

## 2023-10-11 RX ORDER — SODIUM CHLORIDE 9 MG/ML
250 INJECTION INTRAMUSCULAR; INTRAVENOUS; SUBCUTANEOUS ONCE
Refills: 0 | Status: COMPLETED | OUTPATIENT
Start: 2023-10-11 | End: 2023-10-11

## 2023-10-11 RX ORDER — SODIUM CHLORIDE 9 MG/ML
1000 INJECTION INTRAMUSCULAR; INTRAVENOUS; SUBCUTANEOUS
Refills: 0 | Status: DISCONTINUED | OUTPATIENT
Start: 2023-10-11 | End: 2023-10-12

## 2023-10-11 RX ORDER — DEXTROSE 50 % IN WATER 50 %
12.5 SYRINGE (ML) INTRAVENOUS ONCE
Refills: 0 | Status: DISCONTINUED | OUTPATIENT
Start: 2023-10-11 | End: 2023-10-12

## 2023-10-11 RX ORDER — CHOLECALCIFEROL (VITAMIN D3) 125 MCG
1000 CAPSULE ORAL DAILY
Refills: 0 | Status: DISCONTINUED | OUTPATIENT
Start: 2023-10-12 | End: 2023-10-12

## 2023-10-11 RX ORDER — INSULIN LISPRO 100/ML
VIAL (ML) SUBCUTANEOUS
Refills: 0 | Status: DISCONTINUED | OUTPATIENT
Start: 2023-10-11 | End: 2023-10-12

## 2023-10-11 RX ORDER — CLOPIDOGREL BISULFATE 75 MG/1
75 TABLET, FILM COATED ORAL ONCE
Refills: 0 | Status: COMPLETED | OUTPATIENT
Start: 2023-10-11 | End: 2023-10-11

## 2023-10-11 RX ADMIN — ATORVASTATIN CALCIUM 80 MILLIGRAM(S): 80 TABLET, FILM COATED ORAL at 22:32

## 2023-10-11 RX ADMIN — SODIUM CHLORIDE 250 MILLILITER(S): 9 INJECTION INTRAMUSCULAR; INTRAVENOUS; SUBCUTANEOUS at 15:11

## 2023-10-11 RX ADMIN — MAGNESIUM OXIDE 400 MG ORAL TABLET 400 MILLIGRAM(S): 241.3 TABLET ORAL at 22:31

## 2023-10-11 RX ADMIN — CLOPIDOGREL BISULFATE 75 MILLIGRAM(S): 75 TABLET, FILM COATED ORAL at 15:10

## 2023-10-11 RX ADMIN — SODIUM CHLORIDE 50 MILLILITER(S): 9 INJECTION INTRAMUSCULAR; INTRAVENOUS; SUBCUTANEOUS at 22:32

## 2023-10-11 RX ADMIN — Medication 100 MILLIGRAM(S): at 22:34

## 2023-10-11 RX ADMIN — Medication 81 MILLIGRAM(S): at 15:10

## 2023-10-11 RX ADMIN — Medication 400 MILLIGRAM(S): at 22:32

## 2023-10-11 RX ADMIN — MAGNESIUM OXIDE 400 MG ORAL TABLET 400 MILLIGRAM(S): 241.3 TABLET ORAL at 15:38

## 2023-10-11 RX ADMIN — Medication 20 MILLIEQUIVALENT(S): at 15:43

## 2023-10-11 NOTE — ASU PATIENT PROFILE, ADULT - FALL HARM RISK - UNIVERSAL INTERVENTIONS
Bed in lowest position, wheels locked, appropriate side rails in place/Call bell, personal items and telephone in reach/Instruct patient to call for assistance before getting out of bed or chair/Non-slip footwear when patient is out of bed/[place] to call system/Physically safe environment - no spills, clutter or unnecessary equipment/Purposeful Proactive Rounding/Room/bathroom lighting operational, light cord in reach

## 2023-10-11 NOTE — DISCHARGE NOTE PROVIDER - NSDCCPCAREPLAN_GEN_ALL_CORE_FT
PRINCIPAL DISCHARGE DIAGNOSIS  Diagnosis: CAD (coronary artery disease)  Assessment and Plan of Treatment: Coronary artery disease is the buildup of plaque in the arteries that supply oxygen-rich blood to your heart. Plaque causes a narrowing or blockage that could result in a heart attack. Symptoms include chest pain or discomfort, shortness of breath, dizziness, palpitations, fatigue or reduced exercise tolerance. .  Go to the ED with any acute onset of chest pain, palpitations, shortness of breath or dizziness. Do NOT miss a dose or stop taking your Aspirin and Plavix (clopidogrel); these medications keep your stent open and prevent a heart attack. If anyone tells you to stop these medications, speak to your cardiologist immediately.  Managing risk factors will help keep your stent open and prevent future blockages, risk factors may include: high blood pressure, high cholesterol, diabetes, obesity, sedentary life style and smoking.    Your diet should be low in fat, cholesterol, salt and carbohydrates, increase fruits (caution if diabetic), vegetables and whole grains/fiber rich foods.   Take all your cardiac  medications as prescribed.    Exercise is a very important factor in heart health. Once your post procedure restrictions have passed, you should engage in heart healthy, aerobic exercise. Be sure to have clearance from your cardiologist. Cardiac rehab programs could be extremely beneficial and your cardiologist could help set this up.   Follow up with your cardiologist within 1-2 weeks after your procedure.   Call your cardiologist or our unit ([phone]) with any questions or concerns that may arise.      SECONDARY DISCHARGE DIAGNOSES  Diagnosis: HTN (hypertension)  Assessment and Plan of Treatment: Beta Blocker: Continue to take your Toprol 100mg once a day  Other:    DASH Diet (to lower high blood pressure):  - Try to eat foods rich in potassium, calcium, magnesium, fiber, and protein  - Limit salt (sodium) intake to less than 1,500 mg per day  - Eat vegetables, fruits, and whole grains  - Include fat-free or low-fat dairy products, fish, poultry, beans, nuts, and vegetable oils  - Limit foods that are high in saturated or trans fat, such as fatty meats, full-fat dairy products, and tropical oils such as coconut, palm kernel, and palm oils  - Limit sugar-sweetened beverages and sweets  For more information: https://www.nhlbi.nih.gov/education/dash-eating-plan    Diagnosis: HLD (hyperlipidemia)  Assessment and Plan of Treatment: Goal Non-HDL < 100, LDL < 70  Statin: Continue atorvastatin 80mg once a day at bedtime  Other:   Continue with your cholesterol medications. Eat a heart healthy diet that is low in saturated fats and salt, and includes whole grains, fruits, vegetables and lean protein; exercise regularly (consult with your physician or cardiologist first); maintain a heart healthy weight; if you smoke - quit (A resource to help you stop smoking is the [facility] – phone number [phone].). Continue to follow with your primary physician or cardiologist.  Seek medical help for dizziness, Lightheadedness, Blurry vision, Headache, Chest pain, Shortness of breath       PRINCIPAL DISCHARGE DIAGNOSIS  Diagnosis: CAD (coronary artery disease)  Assessment and Plan of Treatment: Coronary artery disease is the buildup of plaque in the arteries that supply oxygen-rich blood to your heart. Plaque causes a narrowing or blockage that could result in a heart attack. Symptoms include chest pain or discomfort, shortness of breath, dizziness, palpitations, fatigue or reduced exercise tolerance. .  Go to the ED with any acute onset of chest pain, palpitations, shortness of breath or dizziness. Do NOT miss a dose or stop taking your Aspirin and Plavix (clopidogrel); these medications keep your stent open and prevent a heart attack. If anyone tells you to stop these medications, speak to your cardiologist immediately.  Managing risk factors will help keep your stent open and prevent future blockages, risk factors may include: high blood pressure, high cholesterol, diabetes, obesity, sedentary life style and smoking.    Your diet should be low in fat, cholesterol, salt and carbohydrates, increase fruits (caution if diabetic), vegetables and whole grains/fiber rich foods.   Take all your cardiac  medications as prescribed.    Exercise is a very important factor in heart health. Once your post procedure restrictions have passed, you should engage in heart healthy, aerobic exercise. Be sure to have clearance from your cardiologist. Cardiac rehab programs could be extremely beneficial and your cardiologist could help set this up.   Follow up with your cardiologist within 1-2 weeks after your procedure.   Call your cardiologist or our unit ([phone]) with any questions or concerns that may arise.      SECONDARY DISCHARGE DIAGNOSES  Diagnosis: HTN (hypertension)  Assessment and Plan of Treatment: Beta Blocker: Continue to take your Toprol 100mg once a day  Other:    DASH Diet (to lower high blood pressure):  - Try to eat foods rich in potassium, calcium, magnesium, fiber, and protein  - Limit salt (sodium) intake to less than 1,500 mg per day  - Eat vegetables, fruits, and whole grains  - Include fat-free or low-fat dairy products, fish, poultry, beans, nuts, and vegetable oils  - Limit foods that are high in saturated or trans fat, such as fatty meats, full-fat dairy products, and tropical oils such as coconut, palm kernel, and palm oils  - Limit sugar-sweetened beverages and sweets  For more information: https://www.nhlbi.nih.gov/education/dash-eating-plan    Diagnosis: HLD (hyperlipidemia)  Assessment and Plan of Treatment: Goal Non-HDL < 100, LDL < 70  Lipid panel: Lipid Profile in AM (10.12.23 @ 04:15)    Cholesterol: 102 mg/dL    Triglycerides, Serum: 94: Lipemic. Interpret with caution mg/dL    HDL Cholesterol: 39 mg/dL    Non HDL Cholesterol: 63: Patients Atherosclerotic Cardiovascular Disease (ASCVD) Risk    LDL Cholesterol Calculated: 44 mg/dL  Statin: Continue atorvastatin 80mg once a day at bedtime  Other:   Continue with your cholesterol medications. Eat a heart healthy diet that is low in saturated fats and salt, and includes whole grains, fruits, vegetables and lean protein; exercise regularly (consult with your physician or cardiologist first); maintain a heart healthy weight; if you smoke - quit (A resource to help you stop smoking is the [facility] – phone number [phone].). Continue to follow with your primary physician or cardiologist.  Seek medical help for dizziness, Lightheadedness, Blurry vision, Headache, Chest pain, Shortness of breath      Diagnosis: DM (diabetes mellitus)  Assessment and Plan of Treatment: -Continue glipizide and pioglitazone as previously prescribed  -Hold metformin x 48 Hours. Ok to resume on saturday 10/14 AM

## 2023-10-11 NOTE — CHART NOTE - NSCHARTNOTEFT_GEN_A_CORE
Called by bedside nurse stating pt. is oozing from  R groin sp manually pressure.  0.5 cc of lido with epi was applied. Pt tolerated procedure well. Skin warm and clean w/o bleeding or hematoma.   Vital Signs Last 24 Hrs    Problem: Oozing    Plan:  - Bed rest x 1 more hour  - Right groin site benign     - Currently no active bleeding, hematoma  + palp pedal pulse. `  - Monitoring of the right groin and both lower extremities including neuro-vascular checks and vital signs. Nurse aware  - R groin post procedural care and instructions reviewed with patient.  - Pt. hemodynamically stable

## 2023-10-11 NOTE — DISCHARGE NOTE PROVIDER - NSDCFUADDINST_GEN_ALL_CORE_FT
GROIN INSTRUCTIONS:  No heavy lifting, driving, sex, tub baths, swimming, or any activity that submerges the lower half of the body in water for 48 hours.  Limited walking and stairs for 48 hours.    Change the bandaid after 24 hours and every 24 hours after that.  Keep the puncture site dry and covered with a bandaid until a scab forms.    Observe the site frequently.  If bleeding or a large lump (the size of a golf ball or bigger) occurs lie flat, apply continuous direct pressure just above the puncture site for at least 10 minutes, and notify your physician immediately.  If the bleeding cannot be controlled, call 911 immediately for assistance.  Notify your physician of pain, swelling or any drainage.    Notify your physician immediately if coldness, numbness, discoloration or pain in your foot occurs.

## 2023-10-11 NOTE — DISCHARGE NOTE PROVIDER - NSDCMRMEDTOKEN_GEN_ALL_CORE_FT
Aspir 81 oral delayed release tablet: 1 orally once a day  atorvastatin 80 mg oral tablet: 1 tab(s) orally once a day (at bedtime)  clopidogrel 75 mg oral tablet: 1 tab(s) orally once a day  folic acid 1 mg oral tablet: 1 orally once a day  glipiZIDE 10 mg oral tablet: 2 tab(s) orally 2 times a day  metFORMIN 500 mg oral tablet: 2 tab(s) orally 2 times a day  metoprolol succinate 100 mg oral tablet, extended release: 1 tab(s) orally once a day  pioglitazone 30 mg oral tablet: 1 orally once a day   Aspir 81 oral delayed release tablet: 1 orally once a day  atorvastatin 80 mg oral tablet: 1 tab(s) orally once a day (at bedtime)  cholecalciferol oral tablet: 1000 unit(s) orally once a day  clopidogrel 75 mg oral tablet: 1 tab(s) orally once a day  folic acid 1 mg oral tablet: 1 orally once a day  glipiZIDE 10 mg oral tablet: 2 tab(s) orally 2 times a day  metFORMIN 500 mg oral tablet: 2 tab(s) orally 2 times a day Please hold x 48 Hours. You may resume on Saturday 10/14 AM  metoprolol succinate 100 mg oral tablet, extended release: 1 tab(s) orally once a day  pioglitazone 30 mg oral tablet: 1 orally once a day

## 2023-10-11 NOTE — CONSULT NOTE ADULT - SUBJECTIVE AND OBJECTIVE BOX
[facility]                                              INTERVENTIONAL CARDIOLOGY AT [address], [place] [address]                                             Telephone: [phone]. Fax:[phone]                                                       INTERVENTIONAL CARDIOLOGY CONSULTATION NOTE                                                                                             History obtained by: Patient and medical record  Community Cardi[facility] 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 Patient had a CT angio heart 7/5/23 revealing calcium score of 4693. Patient underwent LHC at [facility] 7/2023 revealing  of the LAD and RCA on that cath was no visualized, Subsequently patient was referred for Ct surgery eval, but patient opted out CT surgery evaluation,. Patient had follow up with Dr Mora. Repeat R/LHC was performed at [facility] 08/07/23 with Dr. Mora. revealing 100%  of mLAD; significant stenosis of a small Om1; 95% stenosis of oRCA s/p Rotational Atherectomy/ Intravascular Lithotripsy and PCI/ 1 GERALD (Megatron 5.0x 24MM). EF on ventriculogram 55%; 1+ AO; 1+ MR. Patient had follow up appointment with Dr. Mora on 10/3/23. He reports complete resolution of his chest pains, and improvement of his WHEELER. He does report he is easily fatigued still with walking and does not walk that much. During office visit, EKG with junctional tachycardia and BP was on the low side. His lisinopril was discontinued during office visit. He reports he has been compliant with his medications since last prior stenting. He presents to cardiac cath lab for elective staged PCI of  of LAD. He is seen in cardiac cath lab holding area. He currently denies chest pain, chest pressure, shortness of breath at rest, palpitations, dizziness, indigestion, arm pain, jaw pain or syncope. He is tolerating his aspirin and plavix. He denies hematemesis, hematochezia, BRBPR, and melena.     Off note: patient resides in [place], [place] as well as [place], [place] (alternates between 6 months in [place] and 6 months in [place] samira year)     Symptoms:        Angina (Class): 2       Ischemic Symptoms: WHEELER    Heart Failure: No       Systolic/Diastolic/Combined: n/a       NYHA Class (within 2 weeks): n/a    Assessment of LVEF (Must be within 6 months):       EF: 55%       Assessed by: Cath       Date: 08/07/23    Prior Cardiac Interventions:       PCI's (Date, Stents, Vessels):      < from: Cardiac Catheterization (08.07.23 @ 17:23) >  2.    Arterial and Venous Access - Left Femoral   3.    RHC   4.    Diagnostic Coronary Angiography   5.    Left Heart Cath   6.    Aortogram   7.    Temporary Pacemaker   8.    PCI: Rotational Atherectomy   9.    PCI: Intravascular Lithotripsy   10.   IVUS   11.   Perclose   12.   Mynx     100%  of mid LAD   Significant stenosis of a small OM1   95% stenosis of the ostium of the RCA: Treated with rotational atherectomy, intravascular lithotripsy and GERALD stenting with IVUS guidance.   Recommendations:   Aspirin and Plavix     Distal left main: There is a 30 % stenosis.      Proximal left anterior descending: The segment is moderately calcified. Angiography shows mild atherosclerosis. Mid left anterior descending: The segment is severely calcified. The distal vessel is supplied by limited collaterals from the First diagonal. There is a 100 % stenosis.      First obtuse marginal: The segment is small. There is a 90 % stenosis.    Ostial right coronary artery: The segment is severely calcified. There is a 95 % stenosis. Treated with rotational atherectomy, intravascular lithotripsy and GERALD stenting with IVUS guidance. (Megatron 5.0 x 24MM)     Aorta   The root exhibits normal size. Ascending aorta: There is no aortic dilation.    Left Heart Cath   Left ventricular function was assessed and demonstrates normal LV wall motion. Global left ventricular function is normal.  Ejection fraction is 55%. C performed: Aortic valve crossed and left mventricular pressures were obtained.  Valves   Aortic Valve: There is mild aortic stenosis. There is mild (1+) aortic insufficiency.  Mitral Valve: The mitral valve exhibits trivial (less than 1+) regurgitation    CABG (Date, Grafts): None    Noninvasive Testing:   CT coronary heart: Calcium score: 4693    Stress Test: Date: None recently           Long Acting Nitrates:     Echo: done at [facility], report not available     PAST MEDICAL HISTORY  Diabetes    HTN (hypertension)    CVA (cerebrovascular accident)    History of first degree AV block    Elevated coronary artery calcium score    DDD (degenerative disc disease), lumbar        Associated Risk Factors:        Frailty Assessment: (none/mild/mod/severe): mild       Cerebrovascular Disease: Yes prior CVA       Chronic Lung Disease: Sleep apnea       Peripheral Arterial Disease: N/A       Chronic Kidney Disease (if yes, what is GFR): N/A       Uncontrolled Diabetes (if yes, what is HgbA1C or FBS): N/A       Poorly Controlled Hypertension (if yes, what is SBP): N/A       Morbid Obesity (if yes, what is BMI): N/A       History of Recent Ventricular Arrhythmia: N/A       Inability to Ambulate Safely: N/A       Need for Therapeutic Anticoagulation: N/A       Antiplatelet or Contrast Allergy: N/A      PAST SURGICAL HISTORY  S/P cardiac cath    SOCIAL HISTORY: Lives with wife in [place]; denies former smoking history; denies etoh use; denies illicit drug use    FAMILY HISTORY:    Family History of Premature Cardiovascular Disease:  Yes [  ] No [ X ]    HOME MEDICATIONS:  Aspir 81 oral delayed release tablet: 1 orally once a day (11 Oct 2023 14:59)  folic acid 1 mg oral tablet: 1 orally once a day (11 Oct 2023 14:59)  glipiZIDE 10 mg oral tablet: 2 tab(s) orally 2 times a day (11 Oct 2023 14:59)  metFORMIN 500 mg oral tablet: 2 tab(s) orally 2 times a day (11 Oct 2023 14:59)  pioglitazone 30 mg oral tablet: 1 orally once a day (11 Oct 2023 14:59)      CURRENT CARDIAC MEDICATIONS:  metoprolol succinate  milliGRAM(s) Oral at bedtime      Antianginal Therapies:        Beta Blockers:metoprolol succinate  milliGRAM(s) Oral at bedtime         Calcium Channel Blockers: n/a       Long Acting Nitrates: n/a       Ranexa: n/a    ALLERGIES:   No Known Allergies      REVIEW OF SYMPTOMS:   CONSTITUTIONAL: no fever, no chills, no weight loss, no weight gain, no fatigue   CARDIOVASCULAR: + WHEELER, +leg edema b/l, denies chest pain, chest pressure, shortness of breath at rest, orthopnea, PND, syncope, indigestion, arm pain, jaw pain, dizziness  RESPIRATORY: no Shortness of breath, no cough, no wheezing  : No dysuria, no hematuria   GI: No dark color stool, no nausea, no diarrhea, no constipation, no abdominal pain   NEURO: No headache, no slurred speech   ALL OTHER REVIEW OF SYSTEMS ARE NEGATIVE.    VITAL SIGNS:  T(C): 36.6 (10-11-23 @ 14:55), Max: 36.6 (10-11-23 @ 14:55)  T(F): 97.9 (10-11-23 @ 14:55), Max: 97.9 (10-11-23 @ 14:55)  HR: 70 (10-11-23 @ 14:55) (70 - 70)  BP: 160/75 (10-11-23 @ 14:55) (160/75 - 160/75)  RR: 16 (10-11-23 @ 14:55) (16 - 16)  SpO2: 99% (10-11-23 @ 14:55) (99% - 99%)    INTAKE AND OUTPUT:       PHYSICAL EXAM:  Constitutional: Comfortable . No acute distress.   HEENT: Atraumatic and normocephalic , neck is supple . no JVD. No carotid bruit.  CNS: A&Ox3. No focal deficits.   Respiratory: CTAB, unlabored   Cardiovascular: RRR normal s1 s2. +systolic murmur  Gastrointestinal: Soft, non-tender. +Bowel sounds.   Extremities: b/l upper extremities acyanotic; warm to touch; motor/sensory function intact; 2+ b/l radial pulses; b/l LE acyanotic; warm to touch; motor/sensory function intact; 2+ left DP pulse; 1+ right DP pulse; b/l femoral pulses 2+  Psychiatric: Calm . no agitation.   Skin: Warm and dry, no ulcers on extremities     LABS:        10-11    137  |  100  |  17.6  ----------------------------<  130<H>  3.9   |  26.0  |  0.88    Ca    9.2      11 Oct 2023 14:50  Mg     1.6     10-11        Urinalysis Basic - ( 11 Oct 2023 14:50 )    Color: x / Appearance: x / SG: x / pH: x  Gluc: 130 mg/dL / Ketone: x  / Bili: x / Urobili: x   Blood: x / Protein: x / Nitrite: x   Leuk Esterase: x / RBC: x / WBC x   Sq Epi: x / Non Sq Epi: x / Bacteria: x      ECG: SR with 1st degree AV block and LBBB  Prior ECG: Yes [X  ] No [  ]    Cardiac Cath Risk Assessments:  ASA: 3  Mallampati: 3  Bleeding Risk: 0.8%  Creatinine: 0.97  GFR: 81    PLAN:  -Procedure and risks discussed with patient  -Consent obtained by IC  -EKG and labs reviewed  -Electrolytes supplemented  -Aspirin 81mg PO and Plavix 75mg PO pre procedure ordered  -0.9% NS 250cc bolus ordered to prevent JESUS

## 2023-10-11 NOTE — CONSULT NOTE ADULT - ASSESSMENT
Now s/p LHC via RFA with Dr. Geronimo Mora, pt tolerated procedure well. Pt arrived to recovery in NAD and HDS, RFA access site stable s/p Perclose device, no bleed/hematoma, distal pulse +1, RLE remains acyanotic; warm to touch; motor/sensory function intact.   Intraprocedurally: Lidocaine 1% 10ml; Midazolam 1mg IV; Fentanyl 50mcg IV; Heparin 12,000 units IV; NS bolus 150ml; Clopidogrel 300mg PO; Omnipaque 346ml  Findings: < from: Cardiac Catheterization (10.11.23 @ 17:23) >  Patent stent at the ostium of a very large caliber dominant RCA. Complete Total Occlusion of the mid LAD just after the take off of a very large caliber Diagonal.  Unsuccessful attempt at passing a guidewire across the lesion.   95% stenosis of the ostium of OM1 successfully treated with intravascular Lithotripsy and GERALD stent (XIENCE SKYPOINT 2.5x23MM)  < end of copied text >  Post Cath EKG: SR with 1st degree AVB and LBBB post pci procedure ekg reviewed with Dr. mora  Post procedure, patient denies chest pain, chest pressure, arm pain; jaw pain; shortness of breath, palpitations, dizziness, indigestion, nausea or vomiting. He denies right groin pain, right thigh pain, right back pain or abdominal pain.     Plan:  -Post procedure management/monitoring per protocol  -Access site precautions  -Maintain supine position with HOB flat and RLE straight x 2 hours. If right groin site stable, ok to raise HOB <30 degrees in 2 hours at 2130 (930pm).   -If right groin site stable, ok to sit up >30 degrees in 3 hours at 2230 (1030pm)  -Bedrest x 4 hours post procedure. If right groin site stable in 4 hours, ok to ambulate at 2330 (1130pm)  -Labs and EKG in am  -NS @ 50cc/hr x 12 hours for post PCI hydration  -Repeat ECG if any clinical indication or change on tele  -Continue current medical therapy  -s/p clopidogrel load 300mg PO x1.   -Dual anti platelet therapy with aspirin 81mg PO daily/plavix  75mg PO daily  -Cont BB with Toprol 100mg PO daily  -Cont statin therapy with Lipitor 80mg po qHS   -Educated regarding strict adherence with DAPT   -Educated regarding post procedure management and care  -Discussed the importance of RF modification  -Cardiac rehab info provided/referral and communication to cardiac rehab completed  -F/U outpt in 1-2 weeks with Cardiologist Dr. Mora  -DISPO: Plan for D/C in am if remains HDS, ECG and labs in am stable and without complications    1. CAD:  GDMT:        DAPT: Dual anti platelet therapy with aspirin 81mg PO daily/plavix  75mg PO daily       Statin: Cont statin therapy with Lipitor 80mg po qHS        Beta Blocker: Cont BB with Toprol 100mg PO daily       Aggressive cardiovascular risk reduction and lifestyle modification.  Cardiac rehab info provided/referral and communication to cardiac rehab completed  Educated regarding strict adherence with DAPT   -Educated regarding post procedure management and care  -Discussed the importance of RF modification    2. HTN:  Beta Blocker: Cont BB with Toprol 100mg PO daily  Other:    DASH Diet (to lower high blood pressure):  - Try to eat foods rich in potassium, calcium, magnesium, fiber, and protein  - Limit salt (sodium) intake to less than 1,500 mg per day  - Eat vegetables, fruits, and whole grains  - Include fat-free or low-fat dairy products, fish, poultry, beans, nuts, and vegetable oils  - Limit foods that are high in saturated or trans fat, such as fatty meats, full-fat dairy products, and tropical oils such as coconut, palm kernel, and palm oils  - Limit sugar-sweetened beverages and sweets    For more information: https://www.nhlbi.nih.gov/education/dash-eating-plan    3. HLD:  Goal Non-HDL < 100, LDL < 70  Lipid Panel: CHECK LIPID PANEL IN AM  Statin: Continue atorvastatin 80mg PO QHS  Other:   Continue with your cholesterol medications. Eat a heart healthy diet that is low in saturated fats and salt, and includes whole grains, fruits, vegetables and lean protein; exercise regularly (consult with your physician or cardiologist first); maintain a heart healthy weight; if you smoke - quit (A resource to help you stop smoking is the [facility] – phone number [phone].). Continue to follow with your primary physician or cardiologist.  Seek medical help for dizziness, Lightheadedness, Blurry vision, Headache, Chest pain, Shortness of breath

## 2023-10-11 NOTE — DISCHARGE NOTE PROVIDER - CARE PROVIDER_API CALL
Geronimo Mora  Interventional Cardiology  [address] [place] [address]  Phone: [phone]  Fax: [phone]  Follow Up Time: 1 week

## 2023-10-11 NOTE — DISCHARGE NOTE PROVIDER - HOSPITAL COURSE
HPI: 78M with a PMHx of CAD s/p Rotational Atherectomy/ Shockwave/ PCI with GERALD of RCA (Megatron 5.0 x24MM); HTN; HLD; DM; sleep apnea (wears 2LNC 02 at bedtime); CVA 17 years ago with residual of LUE less strength (shaking of left hand while holding things), cervical and lumbar degenerative disease, radiculopathy, Left knee meniscus surgery, hernia surgery 1997; History of exposure to agent orange while in the  (orange is a weed spray) with initial complaints of WHEELER, and intermittent chest pressure. Patient had a CT angio heart 7/5/23 revealing calcium score of 4693. Patient underwent LHC at [facility] 7/2023 revealing Complete Total Occlusion of the LAD and RCA on that cath was no visualized, Subsequently patient was referred for Ct surgery eval, but patient opted out CT surgery evaluation,. Patient had follow up with Dr Mora. Repeat R/LHC was performed at [facility] 08/07/23 with Dr. Mora. revealing 100% Complete Total Occlusion of mLAD; significant stenosis of a small Om1; 95% stenosis of oRCA s/p Rotational Atherectomy/ Intravascular Lithotripsy and PCI/ 1 GERALD (Megatron 5.0x 24MM). EF on ventriculogram 55%; 1+ AO; 1+ MR. Patient had follow up appointment with Dr. Mora on 10/3/23. He reports complete resolution of his chest pains, and improvement of his WHEELER. He does report he is easily fatigued still with walking and does not walk that much. During office visit, EKG with junctional tachycardia and BP was on the low side. His lisinopril was discontinued during office visit. He reports he has been compliant with his medications since last prior stenting. He presents to cardiac cath lab for elective staged PCI of Complete Total Occlusion of LAD. He is seen in cardiac cath lab holding area. He currently denies chest pain, chest pressure, shortness of breath at rest, palpitations, dizziness, indigestion, arm pain, jaw pain or syncope. He is tolerating his aspirin and plavix. He denies hematemesis, hematochezia, BRBPR, and melena.   Off note: patient resides in [place], [place] as well as [place], [place] (alternates between 6 months in [place] and 6 months in [place] samira year)     Now s/p LHC via RFA with Dr. Geronimo Mora, pt tolerated procedure well. Pt arrived to recovery in NAD and HDS, RFA access site stable s/p Perclose device, no bleed/hematoma, distal pulse +1, RLE remains acyanotic; warm to touch; motor/sensory function intact.   Intraprocedurally: Lidocaine 1% 10ml; Midazolam 1mg IV; Fentanyl 50mcg IV; Heparin 12,000 units IV; NS bolus 150ml; Clopidogrel 300mg PO; Omnipaque 346ml  Findings: < from: Cardiac Catheterization (10.11.23 @ 17:23) >  Patent stent at the ostium of a very large caliber dominant RCA. Complete Total Occlusion of the mid LAD just after the take off of a very large caliber Diagonal.  Unsuccessful attempt at passing a guidewire across the lesion.   95% stenosis of the ostium of OM1 successfully treated with intravascular Lithotripsy and GERALD stent (XIENCE SKYPOINT 2.5x23MM)  < end of copied text >  Post Cath EKG: SR with 1st degree AVB and LBBB post pci procedure ekg reviewed with Dr. mora  Post procedure, patient denies chest pain, chest pressure, arm pain; jaw pain; shortness of breath, palpitations, dizziness, indigestion, nausea or vomiting. He denies right groin pain, right thigh pain, right back pain or abdominal pain.     Plan:  -Post procedure management/monitoring per protocol  -Access site precautions  -Maintain supine position with HOB flat and RLE straight x 2 hours. If right groin site stable, ok to raise HOB <30 degrees in 2 hours at 2130 (930pm).   -If right groin site stable, ok to sit up >30 degrees in 3 hours at 2230 (1030pm)  -Bedrest x 4 hours post procedure. If right groin site stable in 4 hours, ok to ambulate at 2330 (1130pm)  -Labs and EKG in am  -NS @ 50cc/hr x 12 hours for post PCI hydration  -Repeat ECG if any clinical indication or change on tele  -Continue current medical therapy  -s/p clopidogrel load 300mg PO x1.   -Dual anti platelet therapy with aspirin 81mg PO daily/plavix  75mg PO daily  -Cont BB with Toprol 100mg PO daily  -Cont statin therapy with Lipitor 80mg po qHS   -Educated regarding strict adherence with DAPT   -Educated regarding post procedure management and care  -Discussed the importance of RF modification  -Cardiac rehab info provided/referral and communication to cardiac rehab completed  -F/U outpt in 1-2 weeks with Cardiologist Dr. Mora  -DISPO: Plan for D/C in am if remains HDS, ECG and labs in am stable and without complications    1. CAD:  GDMT:        DAPT: Dual anti platelet therapy with aspirin 81mg PO daily/plavix  75mg PO daily       Statin: Cont statin therapy with Lipitor 80mg po qHS        Beta Blocker: Cont BB with Toprol 100mg PO daily       Aggressive cardiovascular risk reduction and lifestyle modification.  Cardiac rehab info provided/referral and communication to cardiac rehab completed  Educated regarding strict adherence with DAPT   -Educated regarding post procedure management and care  -Discussed the importance of RF modification    2. HTN:  Beta Blocker: Cont BB with Toprol 100mg PO daily  Other:    DASH Diet (to lower high blood pressure):  - Try to eat foods rich in potassium, calcium, magnesium, fiber, and protein  - Limit salt (sodium) intake to less than 1,500 mg per day  - Eat vegetables, fruits, and whole grains  - Include fat-free or low-fat dairy products, fish, poultry, beans, nuts, and vegetable oils  - Limit foods that are high in saturated or trans fat, such as fatty meats, full-fat dairy products, and tropical oils such as coconut, palm kernel, and palm oils  - Limit sugar-sweetened beverages and sweets    For more information: https://www.nhlbi.nih.gov/education/dash-eating-plan    3. HLD:  Goal Non-HDL < 100, LDL < 70  Lipid Panel: CHECK LIPID PANEL IN AM  Statin: Continue atorvastatin 80mg PO QHS  Other:   Continue with your cholesterol medications. Eat a heart healthy diet that is low in saturated fats and salt, and includes whole grains, fruits, vegetables and lean protein; exercise regularly (consult with your physician or cardiologist first); maintain a heart healthy weight; if you smoke - quit (A resource to help you stop smoking is the [facility] – phone number [phone].). Continue to follow with your primary physician or cardiologist.  Seek medical help for dizziness, Lightheadedness, Blurry vision, Headache, Chest pain, Shortness of breath

## 2023-10-12 VITALS
SYSTOLIC BLOOD PRESSURE: 138 MMHG | DIASTOLIC BLOOD PRESSURE: 64 MMHG | OXYGEN SATURATION: 98 % | RESPIRATION RATE: 14 BRPM | HEART RATE: 69 BPM

## 2023-10-12 LAB
ANION GAP SERPL CALC-SCNC: 9 MMOL/L — SIGNIFICANT CHANGE UP (ref 5–17)
BUN SERPL-MCNC: 20.2 MG/DL — HIGH (ref 8–20)
CALCIUM SERPL-MCNC: 9.2 MG/DL — SIGNIFICANT CHANGE UP (ref 8.4–10.5)
CHLORIDE SERPL-SCNC: 103 MMOL/L — SIGNIFICANT CHANGE UP (ref 96–108)
CHOLEST SERPL-MCNC: 102 MG/DL — SIGNIFICANT CHANGE UP
CO2 SERPL-SCNC: 27 MMOL/L — SIGNIFICANT CHANGE UP (ref 22–29)
CREAT SERPL-MCNC: 1 MG/DL — SIGNIFICANT CHANGE UP (ref 0.5–1.3)
EGFR: 78 ML/MIN/1.73M2 — SIGNIFICANT CHANGE UP
GLUCOSE SERPL-MCNC: 147 MG/DL — HIGH (ref 70–99)
HCT VFR BLD CALC: 36.8 % — LOW (ref 39–50)
HDLC SERPL-MCNC: 39 MG/DL — LOW
HGB BLD-MCNC: 12 G/DL — LOW (ref 13–17)
LIPID PNL WITH DIRECT LDL SERPL: 44 MG/DL — SIGNIFICANT CHANGE UP
MAGNESIUM SERPL-MCNC: 1.8 MG/DL — SIGNIFICANT CHANGE UP (ref 1.6–2.6)
MCHC RBC-ENTMCNC: 31.3 PG — SIGNIFICANT CHANGE UP (ref 27–34)
MCHC RBC-ENTMCNC: 32.6 GM/DL — SIGNIFICANT CHANGE UP (ref 32–36)
MCV RBC AUTO: 96.1 FL — SIGNIFICANT CHANGE UP (ref 80–100)
NON HDL CHOLESTEROL: 63 MG/DL — SIGNIFICANT CHANGE UP
PLATELET # BLD AUTO: 192 K/UL — SIGNIFICANT CHANGE UP (ref 150–400)
POTASSIUM SERPL-MCNC: 4.3 MMOL/L — SIGNIFICANT CHANGE UP (ref 3.5–5.3)
POTASSIUM SERPL-SCNC: 4.3 MMOL/L — SIGNIFICANT CHANGE UP (ref 3.5–5.3)
RBC # BLD: 3.83 M/UL — LOW (ref 4.2–5.8)
RBC # FLD: 14.3 % — SIGNIFICANT CHANGE UP (ref 10.3–14.5)
SODIUM SERPL-SCNC: 139 MMOL/L — SIGNIFICANT CHANGE UP (ref 135–145)
TRIGL SERPL-MCNC: 94 MG/DL — SIGNIFICANT CHANGE UP
WBC # BLD: 7.14 K/UL — SIGNIFICANT CHANGE UP (ref 3.8–10.5)
WBC # FLD AUTO: 7.14 K/UL — SIGNIFICANT CHANGE UP (ref 3.8–10.5)

## 2023-10-12 PROCEDURE — 83735 ASSAY OF MAGNESIUM: CPT

## 2023-10-12 PROCEDURE — 80061 LIPID PANEL: CPT

## 2023-10-12 PROCEDURE — 92972 PERQ TRLUML CORONRY LITHOTRP: CPT

## 2023-10-12 PROCEDURE — 80048 BASIC METABOLIC PNL TOTAL CA: CPT

## 2023-10-12 PROCEDURE — 36415 COLL VENOUS BLD VENIPUNCTURE: CPT

## 2023-10-12 PROCEDURE — 93010 ELECTROCARDIOGRAM REPORT: CPT

## 2023-10-12 PROCEDURE — C1887: CPT

## 2023-10-12 PROCEDURE — 85027 COMPLETE CBC AUTOMATED: CPT

## 2023-10-12 PROCEDURE — C9600: CPT | Mod: LC

## 2023-10-12 PROCEDURE — C1874: CPT

## 2023-10-12 PROCEDURE — C1761: CPT

## 2023-10-12 PROCEDURE — 93454 CORONARY ARTERY ANGIO S&I: CPT | Mod: 59

## 2023-10-12 PROCEDURE — C1769: CPT

## 2023-10-12 PROCEDURE — C1753: CPT

## 2023-10-12 PROCEDURE — 82962 GLUCOSE BLOOD TEST: CPT

## 2023-10-12 PROCEDURE — C1725: CPT

## 2023-10-12 PROCEDURE — C1894: CPT

## 2023-10-12 PROCEDURE — 93005 ELECTROCARDIOGRAM TRACING: CPT

## 2023-10-12 PROCEDURE — C1760: CPT

## 2023-10-12 RX ORDER — CLOPIDOGREL BISULFATE 75 MG/1
1 TABLET, FILM COATED ORAL
Qty: 90 | Refills: 3
Start: 2023-10-12 | End: 2024-10-05

## 2023-10-12 RX ORDER — MAGNESIUM OXIDE 400 MG ORAL TABLET 241.3 MG
400 TABLET ORAL ONCE
Refills: 0 | Status: COMPLETED | OUTPATIENT
Start: 2023-10-12 | End: 2023-10-12

## 2023-10-12 RX ORDER — METFORMIN HYDROCHLORIDE 850 MG/1
2 TABLET ORAL
Qty: 0 | Refills: 0 | DISCHARGE

## 2023-10-12 RX ORDER — CHOLECALCIFEROL (VITAMIN D3) 125 MCG
1000 CAPSULE ORAL
Qty: 0 | Refills: 0 | DISCHARGE
Start: 2023-10-12

## 2023-10-12 RX ADMIN — CLOPIDOGREL BISULFATE 75 MILLIGRAM(S): 75 TABLET, FILM COATED ORAL at 09:38

## 2023-10-12 RX ADMIN — MAGNESIUM OXIDE 400 MG ORAL TABLET 400 MILLIGRAM(S): 241.3 TABLET ORAL at 09:14

## 2023-10-12 RX ADMIN — Medication 81 MILLIGRAM(S): at 09:38

## 2023-10-12 NOTE — DISCHARGE NOTE NURSING/CASE MANAGEMENT/SOCIAL WORK - PATIENT PORTAL LINK FT
You can access the FollowMyHealth Patient Portal offered by [facility] by registering at the following website: [email or web address]. By joining [facility]’s FollowMyHealth portal, you will also be able to view your health information using other applications (apps) compatible with our system.

## 2023-10-12 NOTE — PROGRESS NOTE ADULT - SUBJECTIVE AND OBJECTIVE BOX
[facility]                                              INTERVENTIONAL CARDIOLOGY AT [address], [place] [address]                                             Telephone: [phone]. Fax:[phone]                                                       INTERVENTIONAL CARDIOLOGY CONSULTATION NOTE                                                                                             History obtained by: Patient and medical record  Community Cardiologist: Dr. Mora  Reason for Consultation: Evaluation for cardiac catheterization  Available pt records reviewed: Yes [ x ] No [  ]    Chief complaint:    Patient is a 78y old  Male who presents with a chief complaint of Staged PCI of complete total occlusion of LAD (11 Oct 2023 19:50)      HPI: HPI: 78M with a PMHx of CAD s/p Rotational Atherectomy/ Shockwave/ PCI with GERALD of RCA (Megatron 5.0 x24MM); HTN; HLD; DM; sleep apnea (wears 2LNC 02 at bedtime); CVA 17 years ago with residual of LUE less strength (shaking of left hand while holding things), cervical and lumbar degenerative disease, radiculopathy, Left knee meniscus surgery, hernia surgery 1997; History of exposure to agent orange while in the  (orange is a weed spray) with initial complaints of WHEELER, and intermittent chest pressure. Patient had a CT angio heart 7/5/23 revealing calcium score of 4693. Patient underwent LHC at [facility] 7/2023 revealing  of the LAD and RCA on that cath was no visualized, Subsequently patient was referred for Ct surgery eval, but patient opted out CT surgery evaluation,. Patient had follow up with Dr Mora. Repeat R/LHC was performed at [facility] 08/07/23 with Dr. Mora. revealing 100%  of mLAD; significant stenosis of a small Om1; 95% stenosis of oRCA s/p Rotational Atherectomy/ Intravascular Lithotripsy and PCI/ 1 GERALD (Megatron 5.0x 24MM). EF on ventriculogram 55%; 1+ AO; 1+ MR. Patient had follow up appointment with Dr. Mora on 10/3/23. He reports complete resolution of his chest pains, and improvement of his WHEELER. He does report he is easily fatigued still with walking and does not walk that much. During office visit, EKG with junctional tachycardia and BP was on the low side. His lisinopril was discontinued during office visit. He reports he has been compliant with his medications since last prior stenting. He presents to cardiac cath lab for elective staged PCI of  of LAD. He is seen in cardiac cath lab holding area. He currently denies chest pain, chest pressure, shortness of breath at rest, palpitations, dizziness, indigestion, arm pain, jaw pain or syncope. He is tolerating his aspirin and plavix. He denies hematemesis, hematochezia, BRBPR, and melena.     Off note: patient resides in [place], [place] as well as [place], [place] (alternates between 6 months in [place] and 6 months in [place] samira year)         Anginal Class:        Angina (Class): II       Ischemic Symptoms: WHEELER    Heart Failure:        Systolic/Diastolic/Combined: n/a       NYHA Class (within 2 weeks): n/a      PAST MEDICAL HISTORY  Diabetes    HTN (hypertension)    CVA (cerebrovascular accident)    History of first degree AV block    Elevated coronary artery calcium score    DDD (degenerative disc disease), lumbar        Associated Risk Factors:        Frailty Assessment: (none/mild/mod/severe): none       Cerebrovascular Disease: N/A       Chronic Lung Disease: N/A       Peripheral Arterial Disease: N/A       Chronic Kidney Disease (if yes, what is GFR): N/A       Uncontrolled Diabetes (if yes, what is HgbA1C or FBS): N/A       Poorly Controlled Hypertension (if yes, what is SBP): N/A       Morbid Obesity (if yes, what is BMI): N/A       History of Recent Ventricular Arrhythmia: N/A       Inability to Ambulate Safely: N/A       Need for Therapeutic Anticoagulation: N/A       Antiplatelet or Contrast Allergy: N/A      PAST SURGICAL HISTORY  S/P cardiac cath    FAMILY HISTORY:    Family History of Premature Cardiovascular Disease:  Yes [  ] No [X  ]    HOME MEDICATIONS:  Aspir 81 oral delayed release tablet: 1 orally once a day (11 Oct 2023 14:59)  cholecalciferol oral tablet: 1000 unit(s) orally once a day (12 Oct 2023 09:08)  folic acid 1 mg oral tablet: 1 orally once a day (11 Oct 2023 14:59)  glipiZIDE 10 mg oral tablet: 2 tab(s) orally 2 times a day (11 Oct 2023 19:44)  metFORMIN 500 mg oral tablet: 2 tab(s) orally 2 times a day Please hold x 48 Hours. You may resume on Saturday 10/14 AM (12 Oct 2023 09:08)  pioglitazone 30 mg oral tablet: 1 orally once a day (11 Oct 2023 14:59)      CURRENT CARDIAC MEDICATIONS:  metoprolol succinate  milliGRAM(s) Oral at bedtime      Antianginal Therapies:        Beta Blockers:  metoprolol succinate  milliGRAM(s) Oral at bedtime       Calcium Channel Blockers: n/a       Long Acting Nitrates: n/a       Ranexa: n/a    ALLERGIES:   No Known Allergies      REVIEW OF SYMPTOMS:   CONSTITUTIONAL: no fever, no chills, no weight loss, no weight gain, no fatigue   CARDIOVASCULAR: denies chest pain, chest pressure, shortness of breath, palpitations, dizziness, jaw pain, arm pain, dizziness or syncope overnight.   RESPIRATORY: no Shortness of breath, no cough, no wheezing  : No dysuria, no hematuria   GI: No dark color stool, no nausea, no diarrhea, no constipation, no abdominal pain   NEURO: No headache, no slurred speech   ALL OTHER REVIEW OF SYSTEMS ARE NEGATIVE.    VITAL SIGNS:  T(C): 36.9 (10-12-23 @ 05:55), Max: 36.9 (10-12-23 @ 05:55)  T(F): 98.5 (10-12-23 @ 05:55), Max: 98.5 (10-12-23 @ 05:55)  HR: 69 (10-12-23 @ 08:48) (65 - 81)  BP: 138/64 (10-12-23 @ 08:48) (110/62 - 160/75)  RR: 14 (10-12-23 @ 08:48) (14 - 18)  SpO2: 98% (10-12-23 @ 08:48) (97% - 99%)    INTAKE AND OUTPUT:       PHYSICAL EXAM:  Constitutional: Comfortable . No acute distress.   HEENT: Atraumatic and normocephalic , neck is supple . no JVD. No carotid bruit.  CNS: A&Ox3. No focal deficits.   Respiratory: CTAB, unlabored   Cardiovascular: RRR normal s1 s2. No murmur. No rubs or gallop.  Gastrointestinal: Soft, non-tender. +Bowel sounds.   Extremities: b/l upper extremities acyanotic; warm to touch; motor/sensory function intact; 2+ b/l radial Peripheral Pulses. b/l LE acyanotic; warm to touch; motor/sensory function intact; b/l LE edema  Psychiatric: Calm . no agitation.   Skin: Warm and dry, no ulcers on extremities. Right groin with mild ecchymosis. Right groin remains nontender. Patient denies right groin pain, right thigh pain, abdominal pain or back pain. RLE remains acyanotic; warm to touch; motor/sensory function intact. 1+ right DP pulse; 2+left DP pulse    LABS:                            12.0   7.14  )-----------( 192      ( 12 Oct 2023 04:15 )             36.8     10-12    139  |  103  |  20.2<H>  ----------------------------<  147<H>  4.3   |  27.0  |  1.00    Ca    9.2      12 Oct 2023 04:15  Mg     1.8     10-12        Urinalysis Basic - ( 12 Oct 2023 04:15 )    Color: x / Appearance: x / SG: x / pH: x  Gluc: 147 mg/dL / Ketone: x  / Bili: x / Urobili: x   Blood: x / Protein: x / Nitrite: x   Leuk Esterase: x / RBC: x / WBC x   Sq Epi: x / Non Sq Epi: x / Bacteria: x      10-12-23 @ 04:15  CHolesterol: 102,  HDL: 39,  LDL: 44, Triglycerides: 94             ECG: SR with 1st degree AV block, LBBB no acute ischemic changes  Prior ECG: Yes [ X ] No [  ]

## 2023-10-12 NOTE — PROGRESS NOTE ADULT - ASSESSMENT
POD 1 s/p LHC via RFA with Dr. Geronimo Mora, pt tolerated procedure well. RFA access site stable s/p Perclose device, no bleed/hematoma, distal pulse +1, RLE remains acyanotic; warm to touch; motor/sensory function intact.   Patient denies chest pain, chest pressure, shortness of breath, arm pain, jaw pain, dizziness or syncopal episodes overnight. He is tolerating ambulating around nurses station without complaints of groin pain.   Intraprocedurally: Lidocaine 1% 10ml; Midazolam 1mg IV; Fentanyl 50mcg IV; Heparin 12,000 units IV; NS bolus 150ml; Clopidogrel 300mg PO; Omnipaque 346ml  Findings: < from: Cardiac Catheterization (10.11.23 @ 17:23) >  Patent stent at the ostium of a very large caliber dominant RCA. Complete Total Occlusion of the mid LAD just after the take off of a very large caliber Diagonal.  Unsuccessful attempt at passing a guidewire across the lesion.   95% stenosis of the ostium of OM1 successfully treated with intravascular Lithotripsy and GERALD stent (XIENCE SKYPOINT 2.5x23MM)  < end of copied text >  Post Cath EKG: SR 1st degree AV block; LBBB with no acute ischemic changes    Plan:  -Post procedure management/monitoring per protocol  -Access site precautions  -Continue current medical therapy  -s/p clopidogrel load 300mg PO x1.   -Dual anti platelet therapy with aspirin 81mg PO daily/plavix  75mg PO daily  -Cont BB with Toprol 100mg PO daily  -Cont statin therapy with Lipitor 80mg po qHS   -Educated regarding strict adherence with DAPT   -Educated regarding post procedure management and care  -Discussed the importance of RF modification  -Cardiac rehab info provided/referral and communication to cardiac rehab completed  -F/U outpt in 1-2 weeks with Cardiologist Dr. Mora  -DISPO: Plan for D/C in am if remains HDS, ECG and labs in am stable and without complications    1. CAD:  GDMT:        DAPT: Dual anti platelet therapy with aspirin 81mg PO daily/plavix  75mg PO daily       Statin: Cont statin therapy with Lipitor 80mg po qHS        Beta Blocker: Cont BB with Toprol 100mg PO daily       Aggressive cardiovascular risk reduction and lifestyle modification.  Cardiac rehab info provided/referral and communication to cardiac rehab completed  Educated regarding strict adherence with DAPT   -Educated regarding post procedure management and care  -Discussed the importance of RF modification    2. HTN:  Beta Blocker: Cont BB with Toprol 100mg PO daily  Other:    DASH Diet (to lower high blood pressure):  - Try to eat foods rich in potassium, calcium, magnesium, fiber, and protein  - Limit salt (sodium) intake to less than 1,500 mg per day  - Eat vegetables, fruits, and whole grains  - Include fat-free or low-fat dairy products, fish, poultry, beans, nuts, and vegetable oils  - Limit foods that are high in saturated or trans fat, such as fatty meats, full-fat dairy products, and tropical oils such as coconut, palm kernel, and palm oils  - Limit sugar-sweetened beverages and sweets    For more information: https://www.nhlbi.nih.gov/education/dash-eating-plan    3. HLD:  Goal Non-HDL < 100, LDL < 70  Lipid Panel: Lipid Profile in AM (10.12.23 @ 04:15)    Cholesterol: 102 mg/dL   Triglycerides, Serum: 94: Lipemic. Interpret with caution mg/dL   HDL Cholesterol: 39 mg/dL   Non HDL Cholesterol: 63: Patients Atherosclerotic Cardiovascular Disease (ASCVD) Risk  Optimal Level (mg/dL)   LDL Cholesterol Calculated: 44 mg/dL  Statin: Continue atorvastatin 80mg PO QHS  Other:   Continue with your cholesterol medications. Eat a heart healthy diet that is low in saturated fats and salt, and includes whole grains, fruits, vegetables and lean protein; exercise regularly (consult with your physician or cardiologist first); maintain a heart healthy weight; if you smoke - quit (A resource to help you stop smoking is the [facility] – phone number [phone].). Continue to follow with your primary physician or cardiologist.  Seek medical help for dizziness, Lightheadedness, Blurry vision, Headache, Chest pain, Shortness of breath    4. DM:  -Continue pioglitazone and glipizide as previously prescribed,   -Please hold metformin x 48 hours. You may resume in 2 days on Saturday 10/14 AM  With a diagnosis of diabetes comes a strict diet regimen to help control blood sugars by watching carbohydrate consumption. Carbohydrates, such as starches, fruits, dairy and starchy vegetables, is the food group that affects glucose levels in the body. Carefully monitoring carbohydrate intake is a main component of the diabetic diet; eating three meals each day that are roughly equivalent in size can help control blood sugars. A registered dietitian can help you plan a diet customized to your individual needs.

## 2023-10-13 LAB — GLUCOSE BLDC GLUCOMTR-MCNC: 93 MG/DL — SIGNIFICANT CHANGE UP (ref 70–99)

## 2024-05-24 NOTE — ED ADULT NURSE REASSESSMENT NOTE - NEURO WDL
Alert and oriented to person, place and time, memory intact, behavior appropriate to situation, PERRL.
Statement Selected

## 2025-06-16 ENCOUNTER — OFFICE VISIT (OUTPATIENT)
Dept: URBAN - METROPOLITAN AREA CLINIC 10 | Facility: CLINIC | Age: 80
End: 2025-06-16
Payer: COMMERCIAL

## 2025-06-16 DIAGNOSIS — H26.493 OTHER SECONDARY CATARACT, BILATERAL: Primary | ICD-10-CM

## 2025-06-16 DIAGNOSIS — E11.3293 TYPE 2 DIAB W MILD NONPRLF DIABETIC RTNOP W/O MACULAR EDEMA, BILATERAL: ICD-10-CM

## 2025-06-16 DIAGNOSIS — H35.379 PUCKERING OF MACULA, UNSPECIFIED EYE: ICD-10-CM

## 2025-06-16 DIAGNOSIS — H43.392 OTHER VITREOUS OPACITIES, LEFT EYE: ICD-10-CM

## 2025-06-16 PROCEDURE — 99204 OFFICE O/P NEW MOD 45 MIN: CPT | Performed by: STUDENT IN AN ORGANIZED HEALTH CARE EDUCATION/TRAINING PROGRAM

## 2025-06-16 PROCEDURE — 92134 CPTRZ OPH DX IMG PST SGM RTA: CPT | Performed by: STUDENT IN AN ORGANIZED HEALTH CARE EDUCATION/TRAINING PROGRAM

## 2025-06-16 ASSESSMENT — VISUAL ACUITY
OS: 20/30
OD: 20/40

## 2025-06-16 ASSESSMENT — INTRAOCULAR PRESSURE
OD: 14
OS: 14

## 2025-07-10 ENCOUNTER — SURGERY (OUTPATIENT)
Dept: URBAN - METROPOLITAN AREA SURGERY 5 | Facility: SURGERY | Age: 80
End: 2025-07-10
Payer: COMMERCIAL

## 2025-07-10 PROCEDURE — 66821 AFTER CATARACT LASER SURGERY: CPT | Performed by: OPHTHALMOLOGY
